# Patient Record
Sex: FEMALE | Race: WHITE | Employment: UNEMPLOYED | URBAN - METROPOLITAN AREA
[De-identification: names, ages, dates, MRNs, and addresses within clinical notes are randomized per-mention and may not be internally consistent; named-entity substitution may affect disease eponyms.]

---

## 2017-01-04 ENCOUNTER — HOSPITAL ENCOUNTER (OUTPATIENT)
Dept: OTHER | Age: 82
Discharge: HOME | End: 2017-01-31
Attending: INTERNAL MEDICINE | Admitting: INTERNAL MEDICINE

## 2017-08-09 ENCOUNTER — HOSPITAL ENCOUNTER (OUTPATIENT)
Dept: WOMENS IMAGING | Age: 82
Discharge: HOME | End: 2017-08-09
Attending: FAMILY MEDICINE | Admitting: FAMILY MEDICINE

## 2017-08-09 DIAGNOSIS — Z12.31 VISIT FOR SCREENING MAMMOGRAM: ICD-10-CM

## 2021-04-07 ENCOUNTER — HOSPITAL ENCOUNTER (OUTPATIENT)
Dept: WOUND CARE | Age: 86
Discharge: HOME | End: 2021-04-07
Payer: MEDICARE

## 2021-04-07 VITALS
WEIGHT: 110.45 LBS | HEIGHT: 63 IN | BODY MASS INDEX: 19.57 KG/M2 | SYSTOLIC BLOOD PRESSURE: 149 MMHG | TEMPERATURE: 97.7 F | HEART RATE: 89 BPM | RESPIRATION RATE: 18 BRPM | DIASTOLIC BLOOD PRESSURE: 84 MMHG

## 2021-04-07 DIAGNOSIS — I73.9 PERIPHERAL VASCULAR DISEASE (HCC): ICD-10-CM
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
## 2021-04-07 DIAGNOSIS — M79.89 PAIN AND SWELLING OF LEFT LOWER LEG: ICD-10-CM

## 2021-04-07 DIAGNOSIS — M79.662 PAIN AND SWELLING OF LEFT LOWER LEG: ICD-10-CM

## 2021-04-07 DIAGNOSIS — S81.802A TRAUMATIC OPEN WOUND OF LOWER LEG, LEFT, INITIAL ENCOUNTER: ICD-10-CM

## 2021-04-07 PROCEDURE — 11045 DBRDMT SUBQ TISS EACH ADDL: CPT

## 2021-04-07 PROCEDURE — 11042 DBRDMT SUBQ TIS 1ST 20SQCM/<: CPT | Performed by: NURSE PRACTITIONER

## 2021-04-07 PROCEDURE — 11045 DBRDMT SUBQ TISS EACH ADDL: CPT | Performed by: NURSE PRACTITIONER

## 2021-04-07 PROCEDURE — 99202 OFFICE O/P NEW SF 15 MIN: CPT | Performed by: NURSE PRACTITIONER

## 2021-04-07 PROCEDURE — 99203 OFFICE O/P NEW LOW 30 MIN: CPT

## 2021-04-07 PROCEDURE — 11042 DBRDMT SUBQ TIS 1ST 20SQCM/<: CPT

## 2021-04-07 RX ORDER — ICOSAPENT ETHYL 1000 MG/1
2 CAPSULE ORAL 2 TIMES DAILY
COMMUNITY
Start: 2021-02-08

## 2021-04-07 RX ORDER — LIDOCAINE HYDROCHLORIDE 40 MG/ML
SOLUTION TOPICAL ONCE
Status: CANCELLED | OUTPATIENT
Start: 2021-04-07 | End: 2021-04-07

## 2021-04-07 RX ORDER — LIDOCAINE 40 MG/G
CREAM TOPICAL ONCE
Status: CANCELLED | OUTPATIENT
Start: 2021-04-07 | End: 2021-04-07

## 2021-04-07 RX ORDER — POTASSIUM CHLORIDE 750 MG/1
10 TABLET, EXTENDED RELEASE ORAL 2 TIMES DAILY
COMMUNITY

## 2021-04-07 RX ORDER — LIDOCAINE HYDROCHLORIDE 20 MG/ML
JELLY TOPICAL ONCE
Status: CANCELLED | OUTPATIENT
Start: 2021-04-07 | End: 2021-04-07

## 2021-04-07 RX ORDER — BACITRACIN, NEOMYCIN, POLYMYXIN B 400; 3.5; 5 [USP'U]/G; MG/G; [USP'U]/G
OINTMENT TOPICAL ONCE
Status: CANCELLED | OUTPATIENT
Start: 2021-04-07 | End: 2021-04-07

## 2021-04-07 RX ORDER — LIDOCAINE 50 MG/G
OINTMENT TOPICAL ONCE
Status: CANCELLED | OUTPATIENT
Start: 2021-04-07 | End: 2021-04-07

## 2021-04-07 RX ORDER — METOPROLOL TARTRATE 100 MG/1
50 TABLET ORAL 2 TIMES DAILY
COMMUNITY

## 2021-04-07 RX ORDER — SPIRONOLACTONE 25 MG/1
25 TABLET ORAL DAILY
COMMUNITY

## 2021-04-07 RX ORDER — CLOBETASOL PROPIONATE 0.5 MG/G
OINTMENT TOPICAL ONCE
Status: CANCELLED | OUTPATIENT
Start: 2021-04-07 | End: 2021-04-07

## 2021-04-07 RX ORDER — GINSENG 100 MG
CAPSULE ORAL ONCE
Status: CANCELLED | OUTPATIENT
Start: 2021-04-07 | End: 2021-04-07

## 2021-04-07 RX ORDER — BETAMETHASONE DIPROPIONATE 0.05 %
OINTMENT (GRAM) TOPICAL ONCE
Status: CANCELLED | OUTPATIENT
Start: 2021-04-07 | End: 2021-04-07

## 2021-04-07 RX ORDER — FUROSEMIDE 40 MG/1
80 TABLET ORAL 2 TIMES DAILY
COMMUNITY
Start: 2021-02-08

## 2021-04-07 RX ORDER — RANOLAZINE 500 MG/1
500 TABLET, EXTENDED RELEASE ORAL 2 TIMES DAILY
COMMUNITY

## 2021-04-07 RX ORDER — LIDOCAINE HYDROCHLORIDE 40 MG/ML
SOLUTION TOPICAL ONCE
Status: COMPLETED | OUTPATIENT
Start: 2021-04-07 | End: 2021-04-07

## 2021-04-07 RX ORDER — ISOSORBIDE MONONITRATE 60 MG/1
120 TABLET, EXTENDED RELEASE ORAL DAILY
COMMUNITY

## 2021-04-07 RX ORDER — GENTAMICIN SULFATE 1 MG/G
OINTMENT TOPICAL ONCE
Status: CANCELLED | OUTPATIENT
Start: 2021-04-07 | End: 2021-04-07

## 2021-04-07 RX ORDER — BACITRACIN ZINC AND POLYMYXIN B SULFATE 500; 1000 [USP'U]/G; [USP'U]/G
OINTMENT TOPICAL ONCE
Status: CANCELLED | OUTPATIENT
Start: 2021-04-07 | End: 2021-04-07

## 2021-04-07 RX ORDER — ATORVASTATIN CALCIUM 80 MG/1
80 TABLET, FILM COATED ORAL
COMMUNITY
Start: 2020-12-24

## 2021-04-07 RX ORDER — PANTOPRAZOLE SODIUM 40 MG/1
40 GRANULE, DELAYED RELEASE ORAL
COMMUNITY

## 2021-04-07 RX ADMIN — LIDOCAINE HYDROCHLORIDE 5 ML: 40 SOLUTION TOPICAL at 11:23

## 2021-04-07 ASSESSMENT — PAIN - FUNCTIONAL ASSESSMENT: PAIN_FUNCTIONAL_ASSESSMENT: ACTIVITIES ARE NOT PREVENTED

## 2021-04-07 ASSESSMENT — PAIN DESCRIPTION - FREQUENCY: FREQUENCY: INTERMITTENT

## 2021-04-07 ASSESSMENT — PAIN DESCRIPTION - PAIN TYPE: TYPE: ACUTE PAIN

## 2021-04-07 ASSESSMENT — PAIN DESCRIPTION - DESCRIPTORS: DESCRIPTORS: SHARP

## 2021-04-07 NOTE — PROGRESS NOTES
tongue at first sign of chest pain. May repeat every 5 minutes until relief is obtained. If pain persists after taking 3 tabs in a 15-minute period, or the pain is different than is typically experienced, call 9-1-1 immediately.  acetaminophen (TYLENOL) 325 MG tablet Take 650 mg by mouth every 4 hours as needed for Pain      Ascorbic Acid (VITAMIN C) 250 MG tablet Take 250 mg by mouth daily      albuterol (PROVENTIL HFA) 108 (90 BASE) MCG/ACT inhaler Inhale 2 puffs into the lungs every 6 hours as needed for Wheezing or Shortness of Breath. 1 Inhaler 2    tiotropium (SPIRIVA) 18 MCG inhalation capsule Inhale 18 mcg into the lungs daily.  methimazole (TAPAZOLE) 5 MG tablet Take 5 mg by mouth daily       Multiple Vitamins-Minerals (THERAPEUTIC MULTIVITAMIN-MINERALS) tablet Take 1 tablet by mouth daily.  CALCIUM CARB-CHOLECALCIFEROL PO Take by mouth        No current facility-administered medications on file prior to encounter. REVIEW OF SYSTEMS  Review of Systems    Pertinent items are noted in HPI.     Objective:      BP (!) 149/84   Pulse 89   Temp 97.7 °F (36.5 °C) (Temporal)   Resp 18   Ht 5' 3\" (1.6 m)   Wt 110 lb 7.2 oz (50.1 kg)   BMI 19.57 kg/m²     Wt Readings from Last 3 Encounters:   04/07/21 110 lb 7.2 oz (50.1 kg)   06/14/18 124 lb 12.5 oz (56.6 kg)   02/11/16 135 lb (61.2 kg)       PHYSICAL EXAM  Physical Exam    General Appearance: alert and oriented to person, place and time, well-developed and well nourished, frail-appearing, pale and appears younger than stated age  Skin: warm and dry  Head: normocephalic and atraumatic  Eyes: pupils equal, round, and reactive to light  Pulmonary/Chest: clear to auscultation bilaterally- no wheezes, rales or rhonchi, normal air movement, no respiratory distress  Cardiovascular: normal rate, regular rhythm and intact distal pulses  Extremities: no cyanosis, no clubbing and non-pitting edema-  left lower leg and dull green ecchymosis periwound area. Wound:  Wound base red with moderate amount of slough easily debrided to granular base. Still noting some purplish periwound edges. No overt signs of infection. Noting raised, purple area distal to wound with intact skin. Soft skin surface with only small amount of discomfort when palpated. Assessment:        Problem List Items Addressed This Visit     Falls - Primary    Relevant Orders    Initiate Outpatient Wound Care Protocol    Pain and swelling of left lower leg    Relevant Orders    Initiate Outpatient Wound Care Protocol    Peripheral vascular disease ([address]    Relevant Orders    Initiate Outpatient Wound Care Protocol    Traumatic open wound of lower leg, left, initial encounter    Relevant Orders    Initiate Outpatient Wound Care Protocol           Procedure Note  Indications:  Based on my examination of this patient's wound(s)/ulcer(s) today, debridement is required to promote healing and evaluate the wound base. Performed by: YASMIN Chavez CNP    Consent obtained:  Yes    Time out taken:  Yes    Pain Control: Anesthetic  Anesthetic: 4% Lidocaine Liquid Topical(5 ml)       Debridement: Excisional Debridement    Using curette and forceps the wound(s)/ulcer(s) was/were debrided down through and including the removal of epidermis, dermis and subcutaneous tissue. Devitalized Tissue Debrided:  fibrin, biofilm and slough    Pre Debridement Measurements:  Are located in the [place]  Documentation Flow Sheet    Diabetic/Pressure/Non Pressure Ulcers only:  Ulcer: Non-Pressure ulcer, fat layer exposed     Wound/Ulcer #: 1    Post Debridement Measurements:  Wound/Ulcer Descriptions are Pre Debridement except measurements:    Wound 04/07/21 Leg Left; Anterior; Lower #1 Noted 4/2/21 (Active)   Wound Image   04/07/21 1119   Wound Etiology Traumatic 04/07/21 1119   Dressing Status Old drainage noted 04/07/21 1119   Wound Cleansed Vashe 04/07/21 1131   Dressing/Treatment Honey gel/honey paste;Gauze dressing/dressing sponge;Roll gauze 04/07/21 1157   Wound Length (cm) 7.5 cm 04/07/21 1119   Wound Width (cm) 2.8 cm 04/07/21 1119   Wound Depth (cm) 0.1 cm 04/07/21 1119   Wound Surface Area (cm^2) 21 cm^2 04/07/21 1119   Wound Volume (cm^3) 2.1 cm^3 04/07/21 1119   Post-Procedure Length (cm) 7.6 cm 04/07/21 1131   Post-Procedure Width (cm) 2.9 cm 04/07/21 1131   Post-Procedure Depth (cm) 0.2 cm 04/07/21 1131   Post-Procedure Surface Area (cm^2) 22.04 cm^2 04/07/21 1131   Post-Procedure Volume (cm^3) 4.41 cm^3 04/07/21 1131   Wound Assessment Granulation tissue;Slough 04/07/21 1119   Drainage Amount Moderate 04/07/21 1119   Drainage Description Serosanguinous 04/07/21 1119   Odor None 04/07/21 1119   Nury-wound Assessment Blanchable erythema 04/07/21 1119   Margins Attached edges 04/07/21 1119   Wound Thickness Description not for Pressure Injury Full thickness 04/07/21 1119   Number of days: 0          Total Surface Area Debrided:  22.04 sq cm     Estimated Blood Loss:  Minimal    Hemostasis Achieved:  by pressure    Procedural Pain:  2  / 10     Post Procedural Pain:  1 / 10     Response to treatment:  Well tolerated by patient. Plan:   Pt/son education per provider related to plan of care and products to be used. Discussed goals of care with moist wound healing and decreasing edema in left leg. Pt is in agreement with plan of care and questions answered. Treatment Note please see attached Discharge Instructions    Written patient dismissal instructions given to patient and signed by patient or POA.          Discharge Instructions         [address] and Hyperbaric Oxygen Therapy   Physician Orders and Discharge Instructions  [address]   [address], [facility]  Telephone: [phone]    NAME:  Lisbeth Freeze OF BIRTH:  1935  MEDICAL RECORD NUMBER:  [id number]  DATE: 4/7/2021       Please wash hands with soap and water prior to and right after  every dressing change    CLEANING YOUR WOUND:     Wound Cleansing:   · Do not scrub or use excessive force. · With each dressing change, rinse wounds with 0.9% Saline. (May use wound wash or soft contact solution. Both can be purchased at a local drug store). · If unable to obtain saline, may use a mild soap and water. · Keep wounds dry in the shower unless otherwise instructed by the physician. · For wounds on lower legs, cast covers can be purchased at local drug stores, so that you may shower and keep the wound(s) dry. · Other:      Topical Treatments:  Do not apply lotions, creams, or ointments to the skin around the wound bed unless directed as followed:      [x] Apply around the wound:   [x] moisturizing lotion  [] Antifungal ointment            [] No-Sting barrier film   [] Zinc paste  [] Other:        Vashe wound cleanser:  [x] Instructions for Vashe Wash solution ONLY: Apply enough Vashe to soak a piece of gauze and place on wound bed for 5-10 minutes. DO NOT rinse after Vashe has been applied. Follow dressing application as instructed below. vashe in clinic  Only   ____________________________________________________________   WHAT TO APPLY TO YOUR WOUND AND HOW OFTEN TO CHANGE IT.     WOUND LOCATION              left lower leg       PRIMARY DRESSING:  Other: medihoney     COVER AND SECURE WITH:  4X4 gauze pad  Conforming roll gauze    Avoid contact of tape with skin. FREQUENCY OF DRESSING CHANGES:  Daily         ______________________________________________________________  ________________________________________________________________    COMPRESSION IS A SIGNIFICANT FACTOR TO HEALING YOUR WOUNDS.          COMPRESSION:    SpandaGrip applied in clinic to left lower leg  SpandaGrip medium compression 10-20 mm/Hg to left lower leg ___________________________________________________________________    DIETARY:  Diet as tolerated, Increase Protein    ACTIVITY:  Activity as tolerated    PAIN:  Elevate the affected limb. Use over-the-counter medications you would normally use for pain as permitted by your family doctor. For persistent pain not relieved by the above interventions, please call your family doctor. Call your doctor now or seek immediate medical care if:    · You have symptoms of infection, such as:  ? Increased pain, swelling, warmth, or redness. ? Red streaks leading from the area. ? Pus draining from the area. ? A fever. FOLLOW UP CARE:           Return Appointment:  · DME/Wound Dressing Supplies Provided by: [facility]  4/7/2021  ? (Please call them directly to reorder supplies when you run out)     · ECF or Home Healthcare:      · Return Appointment: With Radha Duncan CNP  in  [address] [place] [address])  [] Wound Assessment with a nurse on:                 [x] Orders placed during your visit:          Electronically signed by Nathalia Cotto RN on 4/7/2021 at 11:29 AM          [address]: Should you experience any significant changes in your wound(s) or have questions about your wound care, please contact the [address] [place] [address] at [address] 8:30 am - 4:30 pm and Friday 8:30 am - 1:00 pm.  If you need help with your wound outside these hours and cannot wait until we are again available, contact your PCP or go to the hospital emergency room. PLEASE NOTE: IF YOU ARE UNABLE TO OBTAIN WOUND SUPPLIES, CONTINUE TO USE THE SUPPLIES YOU HAVE AVAILABLE UNTIL YOU ARE ABLE TO REACH US. IT IS MOST IMPORTANT TO KEEP THE WOUND COVERED AT ALL TIMES.      Physician Signature:_______________________    Date: ___________ Time:  ____________      Radha Duncan CNP               Electronically signed by YASMIN Mcdonough CNP on 4/7/2021 at 2:40 PM

## 2021-04-09 ENCOUNTER — TELEPHONE (OUTPATIENT)
Dept: WOUND CARE | Age: 86
End: 2021-04-09

## 2021-04-12 ENCOUNTER — HOSPITAL ENCOUNTER (OUTPATIENT)
Dept: WOUND CARE | Age: 86
Discharge: HOME | End: 2021-04-12
Payer: MEDICARE

## 2021-04-12 VITALS — DIASTOLIC BLOOD PRESSURE: 72 MMHG | TEMPERATURE: 97.2 F | RESPIRATION RATE: 18 BRPM | SYSTOLIC BLOOD PRESSURE: 138 MMHG

## 2021-04-12 DIAGNOSIS — I73.9 PERIPHERAL VASCULAR DISEASE (HCC): ICD-10-CM

## 2021-04-12 DIAGNOSIS — M79.89 PAIN AND SWELLING OF LEFT LOWER LEG: ICD-10-CM
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
## 2021-04-12 DIAGNOSIS — M79.662 PAIN AND SWELLING OF LEFT LOWER LEG: ICD-10-CM

## 2021-04-12 DIAGNOSIS — S81.802A TRAUMATIC OPEN WOUND OF LOWER LEG, LEFT, INITIAL ENCOUNTER: ICD-10-CM
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
## 2021-04-12 PROCEDURE — 11042 DBRDMT SUBQ TIS 1ST 20SQCM/<: CPT | Performed by: NURSE PRACTITIONER

## 2021-04-12 PROCEDURE — 11045 DBRDMT SUBQ TISS EACH ADDL: CPT | Performed by: NURSE PRACTITIONER

## 2021-04-12 PROCEDURE — 11042 DBRDMT SUBQ TIS 1ST 20SQCM/<: CPT

## 2021-04-12 PROCEDURE — 11045 DBRDMT SUBQ TISS EACH ADDL: CPT

## 2021-04-12 RX ORDER — LIDOCAINE 40 MG/G
CREAM TOPICAL ONCE
Status: CANCELLED | OUTPATIENT
Start: 2021-04-12 | End: 2021-04-12

## 2021-04-12 RX ORDER — LIDOCAINE 50 MG/G
OINTMENT TOPICAL ONCE
Status: CANCELLED | OUTPATIENT
Start: 2021-04-12 | End: 2021-04-12

## 2021-04-12 RX ORDER — BACITRACIN ZINC AND POLYMYXIN B SULFATE 500; 1000 [USP'U]/G; [USP'U]/G
OINTMENT TOPICAL ONCE
Status: CANCELLED | OUTPATIENT
Start: 2021-04-12 | End: 2021-04-12

## 2021-04-12 RX ORDER — LIDOCAINE HYDROCHLORIDE 20 MG/ML
JELLY TOPICAL ONCE
Status: CANCELLED | OUTPATIENT
Start: 2021-04-12 | End: 2021-04-12

## 2021-04-12 RX ORDER — BETAMETHASONE DIPROPIONATE 0.05 %
OINTMENT (GRAM) TOPICAL ONCE
Status: CANCELLED | OUTPATIENT
Start: 2021-04-12 | End: 2021-04-12

## 2021-04-12 RX ORDER — LIDOCAINE HYDROCHLORIDE 40 MG/ML
SOLUTION TOPICAL ONCE
Status: COMPLETED | OUTPATIENT
Start: 2021-04-12 | End: 2021-04-12

## 2021-04-12 RX ORDER — GENTAMICIN SULFATE 1 MG/G
OINTMENT TOPICAL ONCE
Status: CANCELLED | OUTPATIENT
Start: 2021-04-12 | End: 2021-04-12

## 2021-04-12 RX ORDER — BACITRACIN, NEOMYCIN, POLYMYXIN B 400; 3.5; 5 [USP'U]/G; MG/G; [USP'U]/G
OINTMENT TOPICAL ONCE
Status: CANCELLED | OUTPATIENT
Start: 2021-04-12 | End: 2021-04-12

## 2021-04-12 RX ORDER — GINSENG 100 MG
CAPSULE ORAL ONCE
Status: CANCELLED | OUTPATIENT
Start: 2021-04-12 | End: 2021-04-12

## 2021-04-12 RX ORDER — LIDOCAINE HYDROCHLORIDE 40 MG/ML
SOLUTION TOPICAL ONCE
Status: CANCELLED | OUTPATIENT
Start: 2021-04-12 | End: 2021-04-12

## 2021-04-12 RX ORDER — CLOBETASOL PROPIONATE 0.5 MG/G
OINTMENT TOPICAL ONCE
Status: CANCELLED | OUTPATIENT
Start: 2021-04-12 | End: 2021-04-12

## 2021-04-12 RX ADMIN — LIDOCAINE HYDROCHLORIDE 5 ML: 40 SOLUTION TOPICAL at 15:17

## 2021-04-12 ASSESSMENT — PAIN DESCRIPTION - FREQUENCY: FREQUENCY: INTERMITTENT

## 2021-04-12 ASSESSMENT — PAIN DESCRIPTION - DESCRIPTORS: DESCRIPTORS: BURNING;STABBING

## 2021-04-12 ASSESSMENT — PAIN DESCRIPTION - PAIN TYPE: TYPE: ACUTE PAIN

## 2021-04-12 NOTE — PLAN OF CARE
Wound Care Supplies     PLEASE SEND ORDER FOR HYDROFERA BLUE READY WITH BORDER- SIZE 6\"X6\"       [facility]:      [employer] [id number] [address] p: [phone] f: [phone]      Ordering Center:       [address]  [address] 2 [facility] [address]  [place] [address]  [phone]  WOUND CARE Dept: [phone]   FAX NUMBER [unfilled]     Patient Information:      Fox Webb  [address] [address]   [id number]   : 1935  AGE: 82 y.o. GENDER: female   TODAYS DATE:  2021     Insurance:      PRIMARY INSURANCE:  Plan: MEDICARE PART A AND B  Coverage: MEDICARE  Effective Date: 2015  [id number] - (Commercial)  [id number]     SECONDARY INSURANCE:  Plan: [facility] CARE MEDICARE SUPP  Coverage: [employer]  Effective Date: 2015  Keeleynorma Rollins  [id number]  [unfilled]   [unfilled]      Patient Wound Information:           Problem List Items Addressed This Visit           Falls - Primary      Relevant Orders      Initiate Outpatient Wound Care Protocol      Pain and swelling of left lower leg      Relevant Orders      Initiate Outpatient Wound Care Protocol      Peripheral vascular disease ([address]      Relevant Orders      Initiate Outpatient Wound Care Protocol      Traumatic open wound of lower leg, left, initial encounter      Relevant Orders      Initiate Outpatient Wound Care Protocol           ICD-10 codes: P92.917Z     WOUNDS REQUIRING DRESSING SUPPLIES:           Wound 21 Leg Left; Anterior; Lower #1 Noted 21 (Active)   Wound Image   21 1119   Wound Etiology Traumatic 21 1119   Dressing Status Old drainage noted 21 1119   Wound Length (cm) 7.5 cm 21 1119   Wound Width (cm) 2.8 cm 21 1119   Wound Depth (cm) 0.1 cm 21 1119   Wound Surface Area (cm^2) 21 cm^2 21 1119   Wound Volume (cm^3) 2.1 cm^3 21 1119

## 2021-04-13 ENCOUNTER — TELEPHONE (OUTPATIENT)
Dept: WOUND CARE | Age: 86
End: 2021-04-13

## 2021-04-13 NOTE — PROGRESS NOTES
[address]   Progress Note and Procedure Note      Kirk Morocho OSVALDO [facility]  MEDICAL RECORD NUMBER:  [id number]  AGE: 82 y.o. GENDER: female  : 1935  EPISODE DATE:  2021    Subjective:     Chief Complaint   Patient presents with    Wound Check     fOLLOW UP WOUND LEFT ANTERIOR LEG         HISTORY of PRESENT ILLNESS MAX Jauregui is a 82 y.o. female who presents today for wound/ulcer evaluation. Pt accompanied by her daughter. Pt reports has fallen twice; once on 21 when went to see Dr. Lisa Das, then fell again outside his office. Wearing portable oxygen via nasal cannula 3 liters.    History of Wound Context: falls  Wound/Ulcer Pain Timing/Severity: intermittent, moderate  Quality of pain: sharp, shooting less frequent this week  Severity:  2 / 10   Modifying Factors: Pain is relieved/improved with rest  Associated Signs/Symptoms: edema and drainage     Ulcer Identification:  Ulcer Type: traumatic     Contributing Factors: edema, venous stasis, decreased mobility, shear force and anticoagulation therapy     Acute Wound: N/A  PAST MEDICAL HISTORY        Diagnosis Date    Atrial fibrillation (HCC)     CAD (coronary artery disease)     CHF (congestive heart failure) (HCC)     Constipation 9/22/15    ED visit for constipation    [facility]     Falls 2021    Hyperlipidemia     Hypertension     Pain and swelling of left lower leg 2021    Peripheral vascular disease ([facility] 2021    Thyroid disease     Traumatic open wound of lower leg, left, initial encounter 2021       PAST SURGICAL HISTORY    Past Surgical History:   Procedure Laterality Date    COLONOSCOPY      EYE SURGERY      bilateral cataract extraction    VASCULAR SURGERY      AAA repair  right carotid endarterectomy       FAMILY HISTORY    Family History   Problem Relation Age of Onset    Other Mother     Cancer Father     Heart Disease Father     Heart Disease Sister     Heart Disease Brother        SOCIAL HISTORY    Social History     Tobacco Use    Smoking status: Former Smoker    Smokeless tobacco: Never Used   Substance Use Topics    Alcohol use: Yes     Alcohol/week: 7.0 standard drinks     Types: 7 Glasses of wine per week    Drug use: No       ALLERGIES    Allergies   Allergen Reactions    Seasonal        MEDICATIONS    Current Outpatient Medications on File Prior to Encounter   Medication Sig Dispense Refill    metoprolol (LOPRESSOR) 100 MG tablet Take 50 mg by mouth 2 times daily      atorvastatin (LIPITOR) 80 MG tablet Take 80 mg by mouth Daily with supper      apixaban (ELIQUIS) 2.5 MG TABS tablet Take 2.5 mg by mouth 2 times daily      furosemide (LASIX) 40 MG tablet Take 80 mg by mouth 2 times daily      Icosapent Ethyl (VASCEPA) 1 g CAPS capsule Take 2 capsules by mouth 2 times daily      Fe Bisgly-Succ-C-Thre-B12-FA (IRON-150 PO) Take 1 tablet by mouth daily      Wound Dressings ([facility] WOUND/BURN DRESSING) GEL gel Apply 1 each topically daily 1 Tube 0    isosorbide mononitrate (IMDUR) 60 MG extended release tablet Take 120 mg by mouth daily      pantoprazole sodium (PROTONIX) 40 MG PACK packet Take 40 mg by mouth every morning (before breakfast)      potassium chloride (KLOR-CON M) 10 MEQ extended release tablet Take 10 mEq by mouth 2 times daily      ranolazine (RANEXA) 500 MG extended release tablet Take 500 mg by mouth 2 times daily      spironolactone (ALDACTONE) 25 MG tablet Take 25 mg by mouth daily      fluticasone-salmeterol (WIXELA INHUB) 500-50 MCG/DOSE diskus inhaler Inhale 1 puff into the lungs 2 times daily      clopidogrel (PLAVIX) 75 MG tablet Take 75 mg by mouth daily      nitroGLYCERIN (NITROSTAT) 0.4 MG SL tablet Place 0.4 mg under the tongue every 5 minutes as needed for Chest pain Dissolve 1 tab under tongue at first sign of chest pain.  May repeat every 5 minutes until relief is infection. Assessment:        Problem List Items Addressed This Visit     Falls - Primary    Pain and swelling of left lower leg    Peripheral vascular disease ([address]    Traumatic open wound of lower leg, left, initial encounter           Procedure Note  Indications:  Based on my examination of this patient's wound(s)/ulcer(s) today, debridement is required to promote healing and evaluate the wound base. Performed by: YASMIN Santoyo CNP    Consent obtained:  Yes    Time out taken:  Yes    Pain Control: Anesthetic  Anesthetic: 4% Lidocaine Liquid Topical(5 ml)       Debridement: Excisional Debridement    Using curette the wound(s)/ulcer(s) was/were debrided down through and including the removal of epidermis, dermis and subcutaneous tissue. Devitalized Tissue Debrided:  fibrin, biofilm and slough    Pre Debridement Measurements:  Are located in the [place]  Documentation Flow Sheet    Diabetic/Pressure/Non Pressure Ulcers only:  Ulcer: Non-Pressure ulcer, fat layer exposed     Wound/Ulcer #: 1    Post Debridement Measurements:  Wound/Ulcer Descriptions are Pre Debridement except measurements:    Wound 04/07/21 Leg Left; Anterior; Lower #1 Noted 4/2/21 (Active)   Wound Image   04/07/21 1119   Wound Etiology Traumatic 04/07/21 1119   Dressing Status Old drainage noted 04/12/21 1455   Wound Cleansed Vashe 04/07/21 1131   Dressing/Treatment Hydrofera blue 04/12/21 1550   Wound Length (cm) 6.4 cm 04/12/21 1455   Wound Width (cm) 4.2 cm 04/12/21 1455   Wound Depth (cm) 0.1 cm 04/12/21 1455   Wound Surface Area (cm^2) 26.88 cm^2 04/12/21 1455   Change in Wound Size % (l*w) -28 04/12/21 1455   Wound Volume (cm^3) 2.69 cm^3 04/12/21 1455   Wound Healing % -28 04/12/21 1455   Post-Procedure Length (cm) 6.5 cm 04/12/21 1533   Post-Procedure Width (cm) 4.3 cm 04/12/21 1533   Post-Procedure Depth (cm) 0.2 cm 04/12/21 1533   Post-Procedure Surface Area (cm^2) 27.95 cm^2 04/12/21 1533   Post-Procedure Volume (cm^3) 5.59 cm^3 04/12/21 1533   Wound Assessment Granulation tissue;Slough 04/12/21 1455   Drainage Amount Small 04/12/21 1455   Drainage Description Serosanguinous 04/12/21 1455   Odor None 04/12/21 1455   Nury-wound Assessment Blanchable erythema 04/12/21 1455   Margins Attached edges 04/12/21 1455   Wound Thickness Description not for Pressure Injury Full thickness 04/12/21 1455   Number of days: 6          Total Surface Area Debrided:  27.95 sq cm     Estimated Blood Loss:  Minimal    Hemostasis Achieved:  by pressure and by silver nitrate stick    Procedural Pain:  3  / 10     Post Procedural Pain:  2 / 10     Response to treatment:  Well tolerated by patient. Plan:   Pt and daughter education per per provider related to improvements in wound and changes of products in plan of care to [facility]. Pt in agreement of plan and questions answered. Treatment Note please see attached Discharge Instructions    Written patient dismissal instructions given to patient and signed by patient or POA. Discharge [facility]  and Hyperbaric Oxygen Therapy   Physician Orders and Discharge Instructions  [address] [address]  Telephone: [phone]     NAME:  Dewey Sanchez OF BIRTH:  1935  MEDICAL RECORD NUMBER:  [id number]  DATE:  4/12/2021        Please wash hands with soap and water prior to and right after  every dressing change    CLEANING YOUR WOUND:      Wound Cleansing:   · Do not scrub or use excessive force. · With each dressing change, rinse wounds with 0.9% Saline. (May use wound wash or soft contact solution. Both can be purchased at a local drug store). · If unable to obtain saline, may use a mild soap and water. · Keep wounds dry in the shower unless otherwise instructed by the physician.   · For wounds on lower legs, cast covers can be purchased at local drug stores, so that you may shower and keep the wound(s) dry. · Other:        Topical Treatments:  Do not apply lotions, creams, or ointments to the skin around the wound bed unless directed as followed:      [x]? ? Apply around the wound:   [x]? ? moisturizing lotion  []? ? Antifungal ointment            []? ? No-Sting barrier film   []? ? Zinc paste  []? ? Other:             ____________________________________________________________   WHAT TO APPLY TO YOUR WOUND AND HOW OFTEN TO CHANGE IT.      WOUND LOCATION              left lower leg                  PRIMARY DRESSING:  HYDROFERA BLUE READY           Avoid contact of tape with skin.     FREQUENCY OF DRESSING CHANGES:  EVERY OTHER DAY                  ______________________________________________________________  ________________________________________________________________     COMPRESSION IS A SIGNIFICANT FACTOR TO HEALING YOUR WOUNDS.          COMPRESSION:     SpandaGrip applied in clinic to left lower leg  SpandaGrip medium compression 10-20 mm/Hg to left lower leg         ___________________________________________________________________     DIETARY:  Diet as tolerated, Increase Protein     ACTIVITY:  Activity as tolerated     PAIN:  Elevate the affected limb. Use over-the-counter medications you would normally use for pain as permitted by your family doctor. For persistent pain not relieved by the above interventions, please call your family doctor.     Call your doctor now or seek immediate medical care if:    · You have symptoms of infection, such as:  ? Increased pain, swelling, warmth, or redness. ? Red streaks leading from the area. ? Pus draining from the area. ? A fever.         FOLLOW UP CARE:            Return Appointment:  · DME/Wound Dressing Supplies Provided by: [facility]  4/12/2021  ?  (Please call them directly to reorder supplies when you run out)     · ECF or Home Healthcare:      · Return Appointment: With Thanh Shearer CNP  in  1  Week(s)  []? ? Wound Assessment with a nurse on:                 [x]? ? Orders placed during your visit:            Electronically signed by Jaime Melissa RN on 4/12/2021 at 3:48 PM                [facility] Information: Should you experience any significant changes in your wound(s) or have questions about your wound care, please contact the [address] [place] [address] at [address] 8:30 am - 4:30 pm and Friday 8:30 am - 1:00 pm.  If you need help with your wound outside these hours and cannot wait until we are again available, contact your PCP or go to the hospital emergency room.      PLEASE NOTE: IF YOU ARE UNABLE TO OBTAIN WOUND SUPPLIES, CONTINUE TO USE THE SUPPLIES YOU HAVE AVAILABLE UNTIL YOU ARE ABLE TO [facility]. IT IS MOST IMPORTANT TO KEEP THE WOUND COVERED AT ALL TIMES.      Physician Signature:_______________________     Date: ___________ Time:  ____________        Hoda Da Silva CNP          Electronically signed by YASMIN Chu CNP on 4/13/2021 at 12:09 PM

## 2021-04-19 ENCOUNTER — HOSPITAL ENCOUNTER (OUTPATIENT)
Dept: WOUND CARE | Age: 86
Discharge: HOME | End: 2021-04-19
Payer: MEDICARE

## 2021-04-19 VITALS
RESPIRATION RATE: 18 BRPM | DIASTOLIC BLOOD PRESSURE: 75 MMHG | HEART RATE: 92 BPM | SYSTOLIC BLOOD PRESSURE: 164 MMHG | TEMPERATURE: 97 F

## 2021-04-19 DIAGNOSIS — L97.909 CHRONIC VENOUS HYPERTENSION WITH ULCER (HCC): ICD-10-CM

## 2021-04-19 DIAGNOSIS — M79.89 PAIN AND SWELLING OF LEFT LOWER LEG: Primary | ICD-10-CM

## 2021-04-19 DIAGNOSIS — M79.662 PAIN AND SWELLING OF LEFT LOWER LEG: Primary | ICD-10-CM

## 2021-04-19 DIAGNOSIS — S81.802A TRAUMATIC OPEN WOUND OF LOWER LEG, LEFT, INITIAL ENCOUNTER: ICD-10-CM

## 2021-04-19 DIAGNOSIS — L08.9 TRAUMATIC OPEN WOUND OF LOWER LEG WITH INFECTION, LEFT, SUBSEQUENT ENCOUNTER: ICD-10-CM

## 2021-04-19 DIAGNOSIS — S81.802D TRAUMATIC OPEN WOUND OF LOWER LEG WITH INFECTION, LEFT, SUBSEQUENT ENCOUNTER: ICD-10-CM

## 2021-04-19 DIAGNOSIS — R60.0 LOCALIZED EDEMA: ICD-10-CM

## 2021-04-19 DIAGNOSIS — I73.9 PERIPHERAL VASCULAR DISEASE (HCC): ICD-10-CM

## 2021-04-19 DIAGNOSIS — I87.319 CHRONIC VENOUS HYPERTENSION WITH ULCER (HCC): ICD-10-CM
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
## 2021-04-19 PROCEDURE — 11042 DBRDMT SUBQ TIS 1ST 20SQCM/<: CPT

## 2021-04-19 PROCEDURE — 11042 DBRDMT SUBQ TIS 1ST 20SQCM/<: CPT | Performed by: NURSE PRACTITIONER

## 2021-04-19 RX ORDER — LIDOCAINE 50 MG/G
OINTMENT TOPICAL ONCE
Status: CANCELLED | OUTPATIENT
Start: 2021-04-19 | End: 2021-04-19

## 2021-04-19 RX ORDER — GENTAMICIN SULFATE 1 MG/G
OINTMENT TOPICAL ONCE
Status: CANCELLED | OUTPATIENT
Start: 2021-04-19 | End: 2021-04-19

## 2021-04-19 RX ORDER — LIDOCAINE 40 MG/G
CREAM TOPICAL ONCE
Status: CANCELLED | OUTPATIENT
Start: 2021-04-19 | End: 2021-04-19

## 2021-04-19 RX ORDER — BETAMETHASONE DIPROPIONATE 0.05 %
OINTMENT (GRAM) TOPICAL ONCE
Status: CANCELLED | OUTPATIENT
Start: 2021-04-19 | End: 2021-04-19

## 2021-04-19 RX ORDER — LIDOCAINE HYDROCHLORIDE 40 MG/ML
SOLUTION TOPICAL ONCE
Status: CANCELLED | OUTPATIENT
Start: 2021-04-19 | End: 2021-04-19

## 2021-04-19 RX ORDER — BACITRACIN, NEOMYCIN, POLYMYXIN B 400; 3.5; 5 [USP'U]/G; MG/G; [USP'U]/G
OINTMENT TOPICAL ONCE
Status: CANCELLED | OUTPATIENT
Start: 2021-04-19 | End: 2021-04-19

## 2021-04-19 RX ORDER — CLOBETASOL PROPIONATE 0.5 MG/G
OINTMENT TOPICAL ONCE
Status: CANCELLED | OUTPATIENT
Start: 2021-04-19 | End: 2021-04-19

## 2021-04-19 RX ORDER — LIDOCAINE HYDROCHLORIDE 40 MG/ML
SOLUTION TOPICAL ONCE
Status: COMPLETED | OUTPATIENT
Start: 2021-04-19 | End: 2021-04-19

## 2021-04-19 RX ORDER — LIDOCAINE HYDROCHLORIDE 20 MG/ML
JELLY TOPICAL ONCE
Status: CANCELLED | OUTPATIENT
Start: 2021-04-19 | End: 2021-04-19

## 2021-04-19 RX ORDER — GINSENG 100 MG
CAPSULE ORAL ONCE
Status: CANCELLED | OUTPATIENT
Start: 2021-04-19 | End: 2021-04-19

## 2021-04-19 RX ORDER — BACITRACIN ZINC AND POLYMYXIN B SULFATE 500; 1000 [USP'U]/G; [USP'U]/G
OINTMENT TOPICAL ONCE
Status: CANCELLED | OUTPATIENT
Start: 2021-04-19 | End: 2021-04-19

## 2021-04-19 RX ADMIN — LIDOCAINE HYDROCHLORIDE: 40 SOLUTION TOPICAL at 14:54

## 2021-04-19 ASSESSMENT — PAIN - FUNCTIONAL ASSESSMENT: PAIN_FUNCTIONAL_ASSESSMENT: ACTIVITIES ARE NOT PREVENTED

## 2021-04-19 ASSESSMENT — PAIN DESCRIPTION - FREQUENCY
FREQUENCY: INTERMITTENT
FREQUENCY: INTERMITTENT

## 2021-04-19 ASSESSMENT — PAIN DESCRIPTION - DESCRIPTORS: DESCRIPTORS: ACHING;BURNING

## 2021-04-19 ASSESSMENT — PAIN DESCRIPTION - ORIENTATION: ORIENTATION: LEFT

## 2021-04-19 ASSESSMENT — PAIN DESCRIPTION - PROGRESSION: CLINICAL_PROGRESSION: NOT CHANGED

## 2021-04-19 ASSESSMENT — PAIN DESCRIPTION - PAIN TYPE: TYPE: ACUTE PAIN

## 2021-04-19 ASSESSMENT — PAIN DESCRIPTION - ONSET: ONSET: ON-GOING

## 2021-04-20 PROBLEM — S81.802D TRAUMATIC OPEN WOUND OF LOWER LEG WITH INFECTION, LEFT, SUBSEQUENT ENCOUNTER: Status: ACTIVE | Noted: 2021-04-20

## 2021-04-20 PROBLEM — L08.9 TRAUMATIC OPEN WOUND OF LOWER LEG WITH INFECTION, LEFT, SUBSEQUENT ENCOUNTER: Status: ACTIVE | Noted: 2021-04-20

## 2021-04-20 NOTE — PROGRESS NOTES
[address]   Progress Note and Procedure Note      Margaret [facility]  MEDICAL RECORD NUMBER:  [id number]  AGE: 82 y.o. GENDER: female  : 1935  EPISODE DATE:  2021    Subjective:     Chief Complaint   Patient presents with    Wound Check     Follow-up visit for a wound to the left lower leg. HISTORY of PRESENT ILLNESS HPI  Margaret Torre is a 82 y. o. female who presents today for wound/ulcer evaluation. Pt accompanied by her daughter. Luanne Triana reports has fallen twice; once on 21 when went to see Dr. José Manuel Fatima, then fell again outside his office. Wearing portable oxygen via nasal cannula 3 liters. Believes wounds are improving.    History of Wound Context: falls  Wound/Ulcer Pain Timing/Severity: intermittent, moderate  Quality of pain: sharp, shooting less frequent this week and relieved by taking tylenol  Severity:  2 / 10   Modifying Factors: Pain is relieved/improved with rest  Associated Signs/Symptoms: edema and drainage     Ulcer Identification:  Ulcer Type: traumatic     Contributing Factors: edema, venous stasis, decreased mobility, shear force and anticoagulation therapy     Acute Wound: N/A  PAST MEDICAL HISTORY        Diagnosis Date    Atrial fibrillation ([address]     CAD (coronary artery disease)     CHF (congestive heart failure) ([address]     Constipation 9/22/15    ED visit for constipation    [facility]     Falls 2021    Hyperlipidemia     Hypertension     Pain and swelling of left lower leg 2021    Peripheral vascular disease ([address] 2021    Thyroid disease     Traumatic open wound of lower leg with infection, left, subsequent encounter 2021    Traumatic open wound of lower leg, left, initial encounter 2021       PAST SURGICAL HISTORY    Past Surgical History:   Procedure Laterality Date    COLONOSCOPY      EYE SURGERY      bilateral cataract extraction    VASCULAR SURGERY AAA repair  right carotid endarterectomy       FAMILY HISTORY    Family History   Problem Relation Age of Onset    Other Mother     Cancer Father     Heart Disease Father     Heart Disease Sister     Heart Disease Brother        SOCIAL HISTORY    Social History     Tobacco Use    Smoking status: Former Smoker    Smokeless tobacco: Never Used   Substance Use Topics    Alcohol use:  Yes     Alcohol/week: 7.0 standard drinks     Types: 7 Glasses of wine per week    Drug use: No       ALLERGIES    Allergies   Allergen Reactions    Seasonal        MEDICATIONS    Current Outpatient Medications on File Prior to Encounter   Medication Sig Dispense Refill    metoprolol (LOPRESSOR) 100 MG tablet Take 50 mg by mouth 2 times daily      atorvastatin (LIPITOR) 80 MG tablet Take 80 mg by mouth Daily with supper      apixaban (ELIQUIS) 2.5 MG TABS tablet Take 2.5 mg by mouth 2 times daily      furosemide (LASIX) 40 MG tablet Take 80 mg by mouth 2 times daily      Icosapent Ethyl (VASCEPA) 1 g CAPS capsule Take 2 capsules by mouth 2 times daily      Fe Bisgly-Succ-C-Thre-B12-FA (IRON-150 PO) Take 1 tablet by mouth daily      Wound Dressings ([facility] WOUND/BURN DRESSING) GEL gel Apply 1 each topically daily 1 Tube 0    isosorbide mononitrate (IMDUR) 60 MG extended release tablet Take 120 mg by mouth daily      pantoprazole sodium (PROTONIX) 40 MG PACK packet Take 40 mg by mouth every morning (before breakfast)      potassium chloride (KLOR-CON M) 10 MEQ extended release tablet Take 10 mEq by mouth 2 times daily      ranolazine (RANEXA) 500 MG extended release tablet Take 500 mg by mouth 2 times daily      spironolactone (ALDACTONE) 25 MG tablet Take 25 mg by mouth daily      fluticasone-salmeterol (WIXELA INHUB) 500-50 MCG/DOSE diskus inhaler Inhale 1 puff into the lungs 2 times daily      clopidogrel (PLAVIX) 75 MG tablet Take 75 mg by mouth daily      nitroGLYCERIN (NITROSTAT) 0.4 MG SL tablet Place 0.4 mg under the tongue every 5 minutes as needed for Chest pain Dissolve 1 tab under tongue at first sign of chest pain. May repeat every 5 minutes until relief is obtained. If pain persists after taking 3 tabs in a 15-minute period, or the pain is different than is typically experienced, call 9-1-1 immediately.  acetaminophen (TYLENOL) 325 MG tablet Take 650 mg by mouth every 4 hours as needed for Pain      Ascorbic Acid (VITAMIN C) 250 MG tablet Take 250 mg by mouth daily      albuterol (PROVENTIL HFA) 108 (90 BASE) MCG/ACT inhaler Inhale 2 puffs into the lungs every 6 hours as needed for Wheezing or Shortness of Breath. 1 Inhaler 2    tiotropium (SPIRIVA) 18 MCG inhalation capsule Inhale 18 mcg into the lungs daily.  methimazole (TAPAZOLE) 5 MG tablet Take 5 mg by mouth daily       Multiple Vitamins-Minerals (THERAPEUTIC MULTIVITAMIN-MINERALS) tablet Take 1 tablet by mouth daily.  CALCIUM CARB-CHOLECALCIFEROL PO Take by mouth        No current facility-administered medications on file prior to encounter. REVIEW OF SYSTEMS  Review of Systems    Pertinent items are noted in HPI. Objective:      BP (!) 164/75   Pulse 92   Temp 97 °F (36.1 °C) (Temporal)   Resp 18     Wt Readings from Last 3 Encounters:   04/07/21 110 lb 7.2 oz (50.1 kg)   06/14/18 124 lb 12.5 oz (56.6 kg)   02/11/16 135 lb (61.2 kg)       PHYSICAL EXAM  Physical Exam    General Appearance: alert and oriented to person, place and time, well-developed and well-nourished, in no acute distress and frail-appearing  Skin: warm and dry  Head: normocephalic and atraumatic  Eyes: pupils equal, round, and reactive to light  Pulmonary/Chest:  normal air movement, no respiratory distress, oxygen in place via nasal cannula  Cardiovascular: normal rate, regular rhythm and intact distal pulses  Extremities: no cyanosis, no clubbing and 1 + edema-  left lower leg and dorsal foot  Wounds:  Wound base red, moist granular.   Debrided easily to 04/19/21 1504   Post-Procedure Depth (cm) 0.2 cm 04/19/21 1504   Post-Procedure Surface Area (cm^2) 14.64 cm^2 04/19/21 1504   Post-Procedure Volume (cm^3) 2.93 cm^3 04/19/21 1504   Wound Assessment Granulation tissue;Slough 04/19/21 1440   Drainage Amount Moderate 04/19/21 1440   Drainage Description Serosanguinous 04/19/21 1440   Odor None 04/19/21 1440   Nury-wound Assessment Dry/flaky 04/19/21 1440   Margins Attached edges 04/19/21 1440   Wound Thickness Description not for Pressure Injury Full thickness 04/19/21 1440   Number of days: 12          Total Surface Area Debrided:  14.64 sq cm     Estimated Blood Loss:  Minimal    Hemostasis Achieved:  by pressure    Procedural Pain:  2  / 10     Post Procedural Pain:  0 / 10     Response to treatment:  Well tolerated by patient. Plan:   Pt/daughter education per provider related to progress of wound to healing. Will add collagen product under Hydrofera blue. Pt agreeable to plan of care and questions answered. Treatment Note please see attached Discharge Instructions    Written patient dismissal instructions given to patient and signed by patient or POA. Discharge Instructions         [facility] Wound Care and Hyperbaric Oxygen Therapy   Physician Orders and Discharge Instructions  [place] [address] [address] [facility] [address], [facility] [address]  Telephone: [phone]      FAX [phone]     NAME: Malena Wooten  DATE OF BIRTH:  1935  MEDICAL RECORD NUMBER:  [id number]  DATE:  4/19/2021        Please wash hands with soap and water prior to and right after  every dressing change    CLEANING YOUR WOUND:      Wound Cleansing:   · Do not scrub or use excessive force. · With each dressing change, rinse wounds with 0.9% Saline. (May use wound wash or soft contact solution. Both can be purchased at a local drug store). · If unable to obtain saline, may use a mild soap and water.   · Keep wounds dry in the shower unless otherwise instructed by the physician. · For wounds on lower legs, cast covers can be purchased at local drug stores, so that you may shower and keep the wound(s) dry. · Other:        Topical Treatments:  Do not apply lotions, creams, or ointments to the skin around the wound bed unless directed as followed:      [x]? ?? Apply around the wound:   [x]? ?? moisturizing lotion  []??? Antifungal ointment            []? ?? No-Sting barrier film   []? ?? Zinc paste  []??? Other:            place mepilex border to skin tear to left lateral  lower leg.   ____________________________________________________________  Manuel Kory TO APPLY TO YOUR WOUND AND HOW OFTEN TO CHANGE IT.      WOUND LOCATION              left lower leg                  PRIMARY DRESSING:  Plain endoform- moistened with saline  HYDROFERA BLUE READY            Avoid contact of tape with skin.     FREQUENCY OF DRESSING CHANGES:  EVERY OTHER DAY                  ______________________________________________________________  ________________________________________________________________     COMPRESSION IS A SIGNIFICANT FACTOR TO HEALING YOUR WOUNDS.          COMPRESSION:     SpandaGrip applied in clinic to left lower leg  SpandaGrip medium compression 10-20 mm/Hg to left lower leg         ___________________________________________________________________     DIETARY:  Diet as tolerated, Increase Protein     ACTIVITY:  Activity as tolerated     PAIN:  Elevate the affected limb. Use over-the-counter medications you would normally use for pain as permitted by your family doctor. For persistent pain not relieved by the above interventions, please call your family doctor.     Call your doctor now or seek immediate medical care if:    · You have symptoms of infection, such as:  ? Increased pain, swelling, warmth, or redness. ? Red streaks leading from the area. ? Pus draining from the area.   ? A fever.         FOLLOW UP CARE:            Return Appointment:  · DME/Wound Dressing Supplies Provided by: [facility]  4/12/2021  ? (Please call them directly to reorder supplies when you run out)     · ECF or Home Healthcare:      · Return Appointment: With Calderon Galdamez CNP  in  1  Week(s)  []??? Wound Assessment with a nurse on:                 [x]? ?? Orders placed during your visit:            Electronically signed by Carlyle Steen RN on 4/19/2021 at 3:11 PM    [address] [place] [address] Information: Should you experience any significant changes in your wound(s) or have questions about your wound care, please contact the [address] [place] [address] [phone] [address] 8:30 am - 4:30 pm and Friday 8:30 am - 1:00 pm.  If you need help with your wound outside these hours and cannot wait until we are again available, contact your PCP or go to the hospital emergency room.      PLEASE NOTE: IF YOU ARE UNABLE TO OBTAIN WOUND SUPPLIES, CONTINUE TO USE THE SUPPLIES YOU HAVE AVAILABLE UNTIL YOU ARE ABLE TO [facility].  IT IS MOST IMPORTANT TO KEEP THE WOUND COVERED AT ALL TIMES.     Physician Signature:_______________________     Date: ___________ Time:  ____________        Calderon Galdamez CNP             Electronically signed by YASMIN Lamar CNP on 4/20/2021 at 11:16 AM

## 2021-04-26 ENCOUNTER — HOSPITAL ENCOUNTER (OUTPATIENT)
Dept: WOUND CARE | Age: 86
Discharge: HOME | End: 2021-04-26
Payer: MEDICARE

## 2021-04-26 VITALS
DIASTOLIC BLOOD PRESSURE: 73 MMHG | TEMPERATURE: 97.3 F | HEART RATE: 93 BPM | RESPIRATION RATE: 18 BRPM | SYSTOLIC BLOOD PRESSURE: 132 MMHG

## 2021-04-26 DIAGNOSIS — I83.029 VENOUS ULCER OF LEFT LEG (HCC): ICD-10-CM
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
## 2021-04-26 DIAGNOSIS — L08.9 TRAUMATIC OPEN WOUND OF LOWER LEG WITH INFECTION, LEFT, SUBSEQUENT ENCOUNTER: Primary | ICD-10-CM

## 2021-04-26 DIAGNOSIS — L97.929 VENOUS ULCER OF LEFT LEG (HCC): ICD-10-CM

## 2021-04-26 DIAGNOSIS — S81.802A TRAUMATIC OPEN WOUND OF LOWER LEG, LEFT, INITIAL ENCOUNTER: ICD-10-CM

## 2021-04-26 DIAGNOSIS — L97.909 CHRONIC VENOUS HYPERTENSION WITH ULCER (HCC): ICD-10-CM

## 2021-04-26 DIAGNOSIS — I87.319 CHRONIC VENOUS HYPERTENSION WITH ULCER (HCC): ICD-10-CM

## 2021-04-26 DIAGNOSIS — I73.9 PERIPHERAL VASCULAR DISEASE (HCC): ICD-10-CM

## 2021-04-26 DIAGNOSIS — M79.89 PAIN AND SWELLING OF LEFT LOWER LEG: ICD-10-CM

## 2021-04-26 DIAGNOSIS — M79.662 PAIN AND SWELLING OF LEFT LOWER LEG: ICD-10-CM

## 2021-04-26 DIAGNOSIS — S81.802D TRAUMATIC OPEN WOUND OF LOWER LEG WITH INFECTION, LEFT, SUBSEQUENT ENCOUNTER: Primary | ICD-10-CM

## 2021-04-26 PROCEDURE — 11042 DBRDMT SUBQ TIS 1ST 20SQCM/<: CPT

## 2021-04-26 PROCEDURE — 11042 DBRDMT SUBQ TIS 1ST 20SQCM/<: CPT | Performed by: NURSE PRACTITIONER

## 2021-04-26 RX ORDER — CLOBETASOL PROPIONATE 0.5 MG/G
OINTMENT TOPICAL ONCE
Status: CANCELLED | OUTPATIENT
Start: 2021-04-26 | End: 2021-04-26

## 2021-04-26 RX ORDER — BACITRACIN, NEOMYCIN, POLYMYXIN B 400; 3.5; 5 [USP'U]/G; MG/G; [USP'U]/G
OINTMENT TOPICAL ONCE
Status: CANCELLED | OUTPATIENT
Start: 2021-04-26 | End: 2021-04-26

## 2021-04-26 RX ORDER — BETAMETHASONE DIPROPIONATE 0.05 %
OINTMENT (GRAM) TOPICAL ONCE
Status: CANCELLED | OUTPATIENT
Start: 2021-04-26 | End: 2021-04-26

## 2021-04-26 RX ORDER — GENTAMICIN SULFATE 1 MG/G
OINTMENT TOPICAL ONCE
Status: CANCELLED | OUTPATIENT
Start: 2021-04-26 | End: 2021-04-26

## 2021-04-26 RX ORDER — LIDOCAINE HYDROCHLORIDE 40 MG/ML
SOLUTION TOPICAL ONCE
Status: COMPLETED | OUTPATIENT
Start: 2021-04-26 | End: 2021-04-26

## 2021-04-26 RX ORDER — BACITRACIN ZINC AND POLYMYXIN B SULFATE 500; 1000 [USP'U]/G; [USP'U]/G
OINTMENT TOPICAL ONCE
Status: CANCELLED | OUTPATIENT
Start: 2021-04-26 | End: 2021-04-26

## 2021-04-26 RX ORDER — LIDOCAINE HYDROCHLORIDE 20 MG/ML
JELLY TOPICAL ONCE
Status: CANCELLED | OUTPATIENT
Start: 2021-04-26 | End: 2021-04-26

## 2021-04-26 RX ORDER — LIDOCAINE 50 MG/G
OINTMENT TOPICAL ONCE
Status: CANCELLED | OUTPATIENT
Start: 2021-04-26 | End: 2021-04-26

## 2021-04-26 RX ORDER — GINSENG 100 MG
CAPSULE ORAL ONCE
Status: CANCELLED | OUTPATIENT
Start: 2021-04-26 | End: 2021-04-26

## 2021-04-26 RX ORDER — LIDOCAINE HYDROCHLORIDE 40 MG/ML
SOLUTION TOPICAL ONCE
Status: CANCELLED | OUTPATIENT
Start: 2021-04-26 | End: 2021-04-26

## 2021-04-26 RX ORDER — LIDOCAINE 40 MG/G
CREAM TOPICAL ONCE
Status: CANCELLED | OUTPATIENT
Start: 2021-04-26 | End: 2021-04-26

## 2021-04-26 RX ADMIN — LIDOCAINE HYDROCHLORIDE 5 ML: 40 SOLUTION TOPICAL at 14:17

## 2021-04-26 ASSESSMENT — PAIN SCALES - GENERAL: PAINLEVEL_OUTOF10: 0

## 2021-04-27 NOTE — PROGRESS NOTES
[address]   Progress Note and Procedure Note      Hadley Oconnell [facility]  MEDICAL RECORD NUMBER:  [id number]  AGE: 82 y.o. GENDER: female  : 1935  EPISODE DATE:  2021    Subjective:     Chief Complaint   Patient presents with    Wound Check     follow up left lower leg wound         HISTORY of PRESENT ILLNESS HPI  Hadley Torre is a 82 y. o. female who presents today for wound/ulcer evaluation. Pt accompanied by her son.  Pt reports has fallen twice; once on 21 when went to see Dr. Gwendolyn Cooley, then fell again outside his office.  Denies falls today. Wearing portable oxygen via nasal cannula 3 liters. Believes wounds are improving. History of Wound Context: falls  Wound/Ulcer Pain Timing/Severity: intermittent, moderate  Quality of pain: sharp, shooting less frequent this week and relieved by taking tylenol  Severity:  2 / 10   Modifying Factors: Pain is relieved/improved with rest  Associated Signs/Symptoms: edema and drainage     Ulcer Identification:  Ulcer Type: traumatic     Contributing Factors: edema, venous stasis, decreased mobility, shear force and anticoagulation therapy     Acute Wound: N/A    Pt assessment of history of frequent falls and home prevention measures (eg rugs), slow rising from sitting or lying positions. Venous stasis condition and related edema.        PAST MEDICAL HISTORY        Diagnosis Date    Atrial fibrillation ([address]     CAD (coronary artery disease)     CHF (congestive heart failure) ([address]     Constipation 9/22/15    ED visit for constipation    COPD (chronic obstructive pulmonary disease) ([address]     Falls 2021    Hyperlipidemia     Hypertension     Pain and swelling of left lower leg 2021    Peripheral vascular disease ([address] 2021    Thyroid disease     Traumatic open wound of lower leg with infection, left, subsequent encounter 2021    Traumatic open wound of lower leg, left, initial encounter 2021 PAST SURGICAL HISTORY    Past Surgical History:   Procedure Laterality Date    COLONOSCOPY      EYE SURGERY      bilateral cataract extraction    VASCULAR SURGERY      AAA repair  right carotid endarterectomy       FAMILY HISTORY    Family History   Problem Relation Age of Onset    Other Mother     Cancer Father     Heart Disease Father     Heart Disease Sister     Heart Disease Brother        SOCIAL HISTORY    Social History     Tobacco Use    Smoking status: Former Smoker    Smokeless tobacco: Never Used   Substance Use Topics    Alcohol use:  Yes     Alcohol/week: 7.0 standard drinks     Types: 7 Glasses of wine per week    Drug use: No       ALLERGIES    Allergies   Allergen Reactions    Seasonal        MEDICATIONS    Current Outpatient Medications on File Prior to Encounter   Medication Sig Dispense Refill    metoprolol (LOPRESSOR) 100 MG tablet Take 50 mg by mouth 2 times daily      atorvastatin (LIPITOR) 80 MG tablet Take 80 mg by mouth Daily with supper      apixaban (ELIQUIS) 2.5 MG TABS tablet Take 2.5 mg by mouth 2 times daily      furosemide (LASIX) 40 MG tablet Take 80 mg by mouth 2 times daily      Icosapent Ethyl (VASCEPA) 1 g CAPS capsule Take 2 capsules by mouth 2 times daily      Fe Bisgly-Succ-C-Thre-B12-FA (IRON-150 PO) Take 1 tablet by mouth daily      Wound Dressings ([facility] WOUND/BURN DRESSING) GEL gel Apply 1 each topically daily 1 Tube 0    isosorbide mononitrate (IMDUR) 60 MG extended release tablet Take 120 mg by mouth daily      pantoprazole sodium (PROTONIX) 40 MG PACK packet Take 40 mg by mouth every morning (before breakfast)      potassium chloride (KLOR-CON M) 10 MEQ extended release tablet Take 10 mEq by mouth 2 times daily      ranolazine (RANEXA) 500 MG extended release tablet Take 500 mg by mouth 2 times daily      spironolactone (ALDACTONE) 25 MG tablet Take 25 mg by mouth daily      fluticasone-salmeterol (WIXELA INHUB) 500-50 MCG/DOSE diskus regular rhythm and intact distal pulses  Extremities: no cyanosis and no clubbing  Wound:  Filling in nicely with only 2 open areas remaining, less pain in general from wound. No overt signs of infection noted. Assessment:        Problem List Items Addressed This Visit     Chronic venous hypertension with ulcer ([address]    Venous ulcer of left leg (HCC)    Falls    Pain and swelling of left lower leg    Peripheral vascular disease ([address]    Traumatic open wound of lower leg, left, initial encounter    Traumatic open wound of lower leg with infection, left, subsequent encounter - Primary           Procedure Note  Indications:  Based on my examination of this patient's wound(s)/ulcer(s) today, debridement is required to promote healing and evaluate the wound base. Performed by: YASMIN Chu - CNP    Consent obtained:  Yes    Time out taken:  Yes    Pain Control: Anesthetic  Anesthetic: 4% Lidocaine Liquid Topical       Debridement: Excisional Debridement    Using curette and forceps the wound(s)/ulcer(s) was/were debrided down through and including the removal of epidermis, dermis and subcutaneous tissue. Devitalized Tissue Debrided:  fibrin, biofilm and slough    Pre Debridement Measurements:  Are located in the [place]  Documentation Flow Sheet    Diabetic/Pressure/Non Pressure Ulcers only:  Ulcer: Non-Pressure ulcer, fat layer exposed     Wound/Ulcer #: 1    Post Debridement Measurements:  Wound/Ulcer Descriptions are Pre Debridement except measurements:    Wound 04/07/21 Leg Left; Anterior; Lower #1 Noted 4/2/21 (Active)   Wound Image   04/07/21 1119   Wound Etiology Traumatic 04/07/21 1119   Dressing Status Old drainage noted 04/19/21 1440   Wound Cleansed Vashe 04/26/21 1557   Dressing/Treatment Hydrofera blue 04/26/21 1557   Wound Length (cm) 4 cm 04/26/21 1414   Wound Width (cm) 2.2 cm 04/26/21 1414   Wound Depth (cm) 0.1 cm 04/26/21 1414   Wound Surface Area (cm^2) 8.8 cm^2 04/26/21 1414 Change in Wound Size % (l*w) 58.1 04/26/21 1414   Wound Volume (cm^3) 0.88 cm^3 04/26/21 1414   Wound Healing % 58 04/26/21 1414   Post-Procedure Length (cm) 4.1 cm 04/26/21 1429   Post-Procedure Width (cm) 2.3 cm 04/26/21 1429   Post-Procedure Depth (cm) 0.2 cm 04/26/21 1429   Post-Procedure Surface Area (cm^2) 9.43 cm^2 04/26/21 1429   Post-Procedure Volume (cm^3) 1.89 cm^3 04/26/21 1429   Wound Assessment Granulation tissue;Slough 04/26/21 1414   Drainage Amount Small 04/26/21 1414   Drainage Description Serosanguinous 04/26/21 1414   Odor None 04/26/21 1414   Nury-wound Assessment Dry/flaky 04/26/21 1414   Margins Undefined edges 04/26/21 1414   Wound Thickness Description not for Pressure Injury Full thickness 04/26/21 1414   Number of days: 19          Total Surface Area Debrided:  5.65 sq cm     Estimated Blood Loss:  Minimal    Hemostasis Achieved:  not needed    Procedural Pain:  2  / 10     Post Procedural Pain:  1 / 10     Response to treatment:  Well tolerated by patient. Plan:   Pt/son education per provider related to plan of care and continued progress of wound to healing. Discussion of prevention of wounds with edema management, home safety measures to decrease risk of falling. Reviewed importance of edema prevention and lotion to legs to provide more suppleness of tissues. Pt encouraged to continue with same plan of care and is agreeable, questions answered. Treatment Note please see attached Discharge Instructions    Written patient dismissal instructions given to patient and signed by patient or POA.          Discharge Instructions         Discharge Instructions           [facility] Wound Care and Hyperbaric Oxygen Therapy   Physician Orders and Discharge Instructions  [place] [address] [facility] [address], [facility]  Telephone: [phone]      FAX [phone]     NAME: Gia 68:  1935  MEDICAL RECORD NUMBER:  [id number]  DATE:  4/26/2021        Please wash hands with soap and water prior to and right after  every dressing change    CLEANING YOUR WOUND:      Wound Cleansing:   · Do not scrub or use excessive force. · With each dressing change, rinse wounds with 0.9% Saline. (May use wound wash or soft contact solution. Both can be purchased at a local drug store). · If unable to obtain saline, may use a mild soap and water. · Keep wounds dry in the shower unless otherwise instructed by the physician. · For wounds on lower legs, cast covers can be purchased at local drug stores, so that you may shower and keep the wound(s) dry. · Other:        Topical Treatments:  Do not apply lotions, creams, or ointments to the skin around the wound bed unless directed as followed:      [x]? ??? Apply around the wound:   [x]? ??? moisturizing lotion  []???? Antifungal ointment            []???? No-Sting barrier film   []???? Zinc paste  []???? Other:            place mepilex border to skin tear to left lateral  lower leg.   ____________________________________________________________  Deryl So TO APPLY TO YOUR WOUND AND HOW OFTEN TO CHANGE IT.      WOUND LOCATION              left lower leg                  PRIMARY DRESSING:  Plain endoform- moistened with saline  HYDROFERA BLUE READY            Avoid contact of tape with skin.     FREQUENCY OF DRESSING CHANGES:  EVERY OTHER DAY                  ______________________________________________________________  ________________________________________________________________     COMPRESSION IS A SIGNIFICANT FACTOR TO HEALING YOUR WOUNDS.          COMPRESSION:     SpandaGrip applied in clinic to left lower leg  SpandaGrip medium compression 10-20 mm/Hg to left lower leg         ___________________________________________________________________     DIETARY:  Diet as tolerated, Increase Protein     ACTIVITY:  Activity as tolerated     PAIN:  Elevate the affected limb.   Use over-the-counter medications you would normally use for pain as permitted by your family doctor. For persistent pain not relieved by the above interventions, please call your family doctor.     Call your doctor now or seek immediate medical care if:    · You have symptoms of infection, such as:  ? Increased pain, swelling, warmth, or redness. ? Red streaks leading from the area. ? Pus draining from the area. ? A fever.         FOLLOW UP CARE:            Return Appointment:  · DME/Wound Dressing Supplies Provided by: [facility]  4/12/2021  ? (Please call them directly to reorder supplies when you run out)     · ECF or Home Healthcare:      · Return Appointment: With Chao Sharif CNP  in  1  Week(s)  []???? Wound Assessment with a nurse on:                 [x]???? Orders placed during your visit:               Electronically signed by Byron Mendoza RN on 4/26/2021 at 2:29 PM            [address] [place] [address] Information: Should you experience any significant changes in your wound(s) or have questions about your wound care, please contact the [address] [place] [address] [phone] [address] [place], [place] [address]ers 8:30 am - 4:30 pm and Friday 8:30 am - 1:00 pm.  If you need help with your wound outside these hours and cannot wait until we are again available, contact your PCP or go to the hospital emergency room.      PLEASE NOTE: IF YOU ARE UNABLE TO OBTAIN WOUND SUPPLIES, CONTINUE TO USE THE SUPPLIES YOU HAVE AVAILABLE UNTIL YOU ARE ABLE TO [facility].  IT IS MOST IMPORTANT TO KEEP THE WOUND COVERED AT ALL TIMES.     Physician Signature:_______________________     Date: ___________ Time:  ____________        Chao Shraif CNP          Electronically signed by YASMIN Ross CNP on 4/27/2021 at 8:49 AM

## 2021-05-03 ENCOUNTER — HOSPITAL ENCOUNTER (OUTPATIENT)
Dept: WOUND CARE | Age: 86
Discharge: HOME | End: 2021-05-03
Payer: MEDICARE

## 2021-05-03 VITALS
SYSTOLIC BLOOD PRESSURE: 123 MMHG | DIASTOLIC BLOOD PRESSURE: 75 MMHG | HEART RATE: 73 BPM | TEMPERATURE: 97.2 F | RESPIRATION RATE: 18 BRPM

## 2021-05-03 DIAGNOSIS — S81.802A TRAUMATIC OPEN WOUND OF LOWER LEG, LEFT, INITIAL ENCOUNTER: ICD-10-CM

## 2021-05-03 DIAGNOSIS — I73.9 PERIPHERAL VASCULAR DISEASE (HCC): ICD-10-CM

## 2021-05-03 DIAGNOSIS — M79.89 PAIN AND SWELLING OF LEFT LOWER LEG: ICD-10-CM
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
## 2021-05-03 DIAGNOSIS — S81.802D TRAUMATIC OPEN WOUND OF LOWER LEG WITH INFECTION, LEFT, SUBSEQUENT ENCOUNTER: ICD-10-CM

## 2021-05-03 DIAGNOSIS — M79.662 PAIN AND SWELLING OF LEFT LOWER LEG: ICD-10-CM

## 2021-05-03 DIAGNOSIS — L08.9 TRAUMATIC OPEN WOUND OF LOWER LEG WITH INFECTION, LEFT, SUBSEQUENT ENCOUNTER: ICD-10-CM

## 2021-05-03 PROCEDURE — 99212 OFFICE O/P EST SF 10 MIN: CPT | Performed by: NURSE PRACTITIONER

## 2021-05-03 PROCEDURE — 99212 OFFICE O/P EST SF 10 MIN: CPT

## 2021-05-03 ASSESSMENT — PAIN DESCRIPTION - FREQUENCY: FREQUENCY: INTERMITTENT

## 2021-05-03 ASSESSMENT — PAIN DESCRIPTION - ONSET: ONSET: GRADUAL

## 2021-05-03 ASSESSMENT — PAIN DESCRIPTION - DESCRIPTORS: DESCRIPTORS: ACHING

## 2021-05-03 ASSESSMENT — PAIN SCALES - GENERAL
PAINLEVEL_OUTOF10: 0
PAINLEVEL_OUTOF10: 0

## 2021-05-04 NOTE — PROGRESS NOTES
[address]   Progress Note and Procedure Note      Molly Herrera OSVALDO [facility]  MEDICAL RECORD NUMBER:  [id number]  AGE: 82 y.o. GENDER: female  : 1935  EPISODE DATE:  5/3/2021    Subjective:     Chief Complaint   Patient presents with    Wound Check     follow up left lower leg         HISTORY of PRESENT ILLNESS HPI  Molly Torre is a 82 y. o. female who presents today for wound/ulcer evaluation. Pt accompanied by her daughter.  Pt reports has fallen twice; once on 21 when went to see Dr. Nancy Collado, then fell again outside his office.  Denies falls this past week. Wearing portable oxygen via nasal cannula 3 liters. Wounds are now closed.   History of Wound Context: falls  Wound/Ulcer Pain Timing/Severity: intermittent, moderate  Quality of pain: much less pain  Severity:  2 / 10   Modifying Factors: Pain is relieved/improved with rest  Associated Signs/Symptoms: edema and drainage     Ulcer Identification:  Ulcer Type: traumatic     Contributing Factors: edema, venous stasis, decreased mobility, shear force and anticoagulation therapy     Acute Wound: N/A     Pt assessment of history of frequent falls and home prevention measures (eg rugs), slow rising from sitting or lying positions.   Venous stasis condition and related edema.         PAST MEDICAL HISTORY        Diagnosis Date    Atrial fibrillation ([address]     CAD (coronary artery disease)     CHF (congestive heart failure) ([address]     Constipation 9/22/15    ED visit for constipation    COPD (chronic obstructive pulmonary disease) ([address]     Falls 2021    Hyperlipidemia     Hypertension     Pain and swelling of left lower leg 2021    Peripheral vascular disease ([address] 2021    Thyroid disease     Traumatic open wound of lower leg with infection, left, subsequent encounter 2021    Traumatic open wound of lower leg, left, initial encounter 2021       PAST SURGICAL HISTORY    Past Surgical History: Procedure Laterality Date    COLONOSCOPY      EYE SURGERY      bilateral cataract extraction    VASCULAR SURGERY      AAA repair  right carotid endarterectomy       FAMILY HISTORY    Family History   Problem Relation Age of Onset    Other Mother     Cancer Father     Heart Disease Father     Heart Disease Sister     Heart Disease Brother        SOCIAL HISTORY    Social History     Tobacco Use    Smoking status: Former Smoker    Smokeless tobacco: Never Used   Substance Use Topics    Alcohol use:  Yes     Alcohol/week: 7.0 standard drinks     Types: 7 Glasses of wine per week    Drug use: No       ALLERGIES    Allergies   Allergen Reactions    Seasonal        MEDICATIONS    Current Outpatient Medications on File Prior to Encounter   Medication Sig Dispense Refill    metoprolol (LOPRESSOR) 100 MG tablet Take 50 mg by mouth 2 times daily      atorvastatin (LIPITOR) 80 MG tablet Take 80 mg by mouth Daily with supper      apixaban (ELIQUIS) 2.5 MG TABS tablet Take 2.5 mg by mouth 2 times daily      furosemide (LASIX) 40 MG tablet Take 80 mg by mouth 2 times daily      Icosapent Ethyl (VASCEPA) 1 g CAPS capsule Take 2 capsules by mouth 2 times daily      Fe Bisgly-Succ-C-Thre-B12-FA (IRON-150 PO) Take 1 tablet by mouth daily      Wound Dressings ([facility] WOUND/BURN DRESSING) GEL gel Apply 1 each topically daily 1 Tube 0    isosorbide mononitrate (IMDUR) 60 MG extended release tablet Take 120 mg by mouth daily      pantoprazole sodium (PROTONIX) 40 MG PACK packet Take 40 mg by mouth every morning (before breakfast)      potassium chloride (KLOR-CON M) 10 MEQ extended release tablet Take 10 mEq by mouth 2 times daily      ranolazine (RANEXA) 500 MG extended release tablet Take 500 mg by mouth 2 times daily      spironolactone (ALDACTONE) 25 MG tablet Take 25 mg by mouth daily      fluticasone-salmeterol (WIXELA INHUB) 500-50 MCG/DOSE diskus inhaler Inhale 1 puff into the lungs 2 times daily vascular disease ([facility] [facility]    Traumatic open wound of lower leg, left, initial encounter    Traumatic open wound of lower leg with infection, left, subsequent encounter           Procedure Note  Indications:  Based on my examination of this patient's wound(s)/ulcer(s) today, debridement is not required to promote healing and evaluate the wound base. Wound/Ulcer Descriptions are Pre Debridement except measurements:    Wound 04/07/21 Leg Left; Anterior; Lower #1 Noted 4/2/21 (Active)   Wound Image   05/03/21 1359   Wound Etiology Traumatic 04/07/21 1119   Dressing Status Old drainage noted 04/19/21 1440   Wound Cleansed Vashe 04/26/21 1557   Dressing/Treatment Hydrofera blue 04/26/21 1557   Wound Length (cm) 0 cm 05/03/21 1359   Wound Width (cm) 0 cm 05/03/21 1359   Wound Depth (cm) 0 cm 05/03/21 1359   Wound Surface Area (cm^2) 0 cm^2 05/03/21 1359   Change in Wound Size % (l*w) 100 05/03/21 1359   Wound Volume (cm^3) 0 cm^3 05/03/21 1359   Wound Healing % 100 05/03/21 1359   Post-Procedure Length (cm) 0 cm 05/03/21 1415   Post-Procedure Width (cm) 0 cm 05/03/21 1415   Post-Procedure Depth (cm) 0 cm 05/03/21 1415   Post-Procedure Surface Area (cm^2) 0 cm^2 05/03/21 1415   Post-Procedure Volume (cm^3) 0 cm^3 05/03/21 1415   Wound Assessment Epithelialization 05/03/21 1359   Drainage Amount Small 04/26/21 1414   Drainage Description Serosanguinous 04/26/21 1414   Odor None 04/26/21 1414   Nury-wound Assessment Dry/flaky 04/26/21 1414   Margins Undefined edges 04/26/21 1414   Wound Thickness Description not for Pressure Injury Full thickness 04/26/21 1414   Number of days: 27       Plan:   Pt/daughter education per provider related to prevention of reoccur ance of wounds considering fragility of skin and preventive measures in environment as well as topically. Questions answered. Treatment Note please see attached Discharge Instructions    Written patient dismissal instructions given to patient and signed by patient or POA. Discharge Instructions         [address]  Physician Orders   [facility] [place] [address], [address]  Telephone: [phone]    NAME:  Gustavo Griffith OF BIRTH:  1935  MEDICAL RECORD NUMBER:  [id number]  DATE:  5/3/2021    Congratulations! You have completed your treatment. Return to your Primary Care Physician for all your health issues. Resume your ordinary activities as tolerated. Take your medications as prescribed by your primary care physician. Check your skin daily for cracks, bruises, sores, or dryness. Use a moisturizer as needed. Clean and dry your skin, using mild soap and warm water (not hot). Avoid alcohol and caffeine and do not smoke. Maintain a nutritious diet. Avoid pressure on your wound site. Keep your legs elevated above the level of the heart whenever possible. Continue to use wraps/stockings/compression as prescribed. Replace compression stockings every four to six months as needed to ensure proper fit. Wear well-fitting shoes and leg garments. Other: PLACE MEPILEX BORDER, LEAVE ON UNTIL THURSDAY. REMOVE AND SHOWER. MAY REPLACE WITH FOAM LIKE DRESSING . PLACE LOTION TO SURROUNDING AREA.        Physician Signature:______________________    Date: ___________ Time:  ____________    Brooklyn Abebe CNP            Electronically signed by Tripp Shetty RN on 5/3/2021 at 2:16 PM                          Electronically signed by YASMIN Jenkins CNP on 5/4/2021 at 2:42 PM

## 2022-02-16 ENCOUNTER — HOSPITAL ENCOUNTER (OUTPATIENT)
Dept: WOUND CARE | Age: 87
Discharge: HOME | End: 2022-02-16
Payer: MEDICARE

## 2022-02-16 VITALS
DIASTOLIC BLOOD PRESSURE: 83 MMHG | TEMPERATURE: 97.4 F | HEART RATE: 95 BPM | RESPIRATION RATE: 18 BRPM | SYSTOLIC BLOOD PRESSURE: 123 MMHG

## 2022-02-16 DIAGNOSIS — L97.919 VENOUS ULCER OF RIGHT LEG (HCC): Primary | ICD-10-CM

## 2022-02-16 DIAGNOSIS — L97.929 VENOUS ULCER OF LEFT LEG (HCC): ICD-10-CM

## 2022-02-16 DIAGNOSIS — I83.019 VENOUS ULCER OF RIGHT LEG (HCC): Primary | ICD-10-CM

## 2022-02-16 DIAGNOSIS — I83.029 VENOUS ULCER OF LEFT LEG (HCC): ICD-10-CM

## 2022-02-16 PROCEDURE — 99213 OFFICE O/P EST LOW 20 MIN: CPT

## 2022-02-16 PROCEDURE — 11042 DBRDMT SUBQ TIS 1ST 20SQCM/<: CPT

## 2022-02-16 PROCEDURE — 29581 APPL MULTLAYER CMPRN SYS LEG: CPT

## 2022-02-16 RX ORDER — GENTAMICIN SULFATE 1 MG/G
OINTMENT TOPICAL ONCE
Status: CANCELLED | OUTPATIENT
Start: 2022-02-16 | End: 2022-02-16

## 2022-02-16 RX ORDER — GINSENG 100 MG
CAPSULE ORAL ONCE
Status: CANCELLED | OUTPATIENT
Start: 2022-02-16 | End: 2022-02-16

## 2022-02-16 RX ORDER — BETAMETHASONE DIPROPIONATE 0.05 %
OINTMENT (GRAM) TOPICAL ONCE
Status: CANCELLED | OUTPATIENT
Start: 2022-02-16 | End: 2022-02-16

## 2022-02-16 RX ORDER — CLOBETASOL PROPIONATE 0.5 MG/G
OINTMENT TOPICAL ONCE
Status: CANCELLED | OUTPATIENT
Start: 2022-02-16 | End: 2022-02-16

## 2022-02-16 RX ORDER — LIDOCAINE HYDROCHLORIDE 20 MG/ML
JELLY TOPICAL ONCE
Status: CANCELLED | OUTPATIENT
Start: 2022-02-16 | End: 2022-02-16

## 2022-02-16 RX ORDER — BACITRACIN ZINC AND POLYMYXIN B SULFATE 500; 1000 [USP'U]/G; [USP'U]/G
OINTMENT TOPICAL ONCE
Status: CANCELLED | OUTPATIENT
Start: 2022-02-16 | End: 2022-02-16

## 2022-02-16 RX ORDER — LIDOCAINE HYDROCHLORIDE 40 MG/ML
SOLUTION TOPICAL ONCE
Status: COMPLETED | OUTPATIENT
Start: 2022-02-16 | End: 2022-02-16

## 2022-02-16 RX ORDER — BACITRACIN, NEOMYCIN, POLYMYXIN B 400; 3.5; 5 [USP'U]/G; MG/G; [USP'U]/G
OINTMENT TOPICAL ONCE
Status: CANCELLED | OUTPATIENT
Start: 2022-02-16 | End: 2022-02-16

## 2022-02-16 RX ORDER — LIDOCAINE HYDROCHLORIDE 40 MG/ML
SOLUTION TOPICAL ONCE
Status: CANCELLED | OUTPATIENT
Start: 2022-02-16 | End: 2022-02-16

## 2022-02-16 RX ORDER — LIDOCAINE 50 MG/G
OINTMENT TOPICAL ONCE
Status: CANCELLED | OUTPATIENT
Start: 2022-02-16 | End: 2022-02-16

## 2022-02-16 RX ORDER — LIDOCAINE 40 MG/G
CREAM TOPICAL ONCE
Status: CANCELLED | OUTPATIENT
Start: 2022-02-16 | End: 2022-02-16

## 2022-02-16 RX ADMIN — LIDOCAINE HYDROCHLORIDE: 40 SOLUTION TOPICAL at 15:43

## 2022-02-16 ASSESSMENT — PAIN SCALES - GENERAL
PAINLEVEL_OUTOF10: 0
PAINLEVEL_OUTOF10: 0

## 2022-02-16 NOTE — PROGRESS NOTES
[address]   Progress Note and Procedure Note      Sabas Elam OSVALDO [facility]  MEDICAL RECORD NUMBER:  [id number]  AGE: 82 y.o. GENDER: female  : 1935  EPISODE DATE:  2022    Subjective:     Chief Complaint   Patient presents with    Wound Check     Initial Visit on Bilateral Shins; Right shin was hit and broke open in 2022; Left Ivy opened 3 days ago on 22; Patient's daughter has been using medihoney and/or Hydrofera Blue         HISTORY of PRESENT ILLNESS HPI     Sammie Lane is a 82 y.o. female who presents today for wound/ulcer evaluation. History of Wound Context: Patient presents as a new patient complaining of wounds on both legs of about one week duration. Patient's daughter was present to help provide the patient's HPI. Patient states the wounds started out as blood blisters. Patient states her daughter is helping her performing dressing changes with application of Hydrofera blue dressing to the wounds. Patient reports on and off drainage from the wounds. Patient states she has some aching pain with pressure to the wounds, but it is tolerable. Patient reports she has CHF and PVD managed by her cardiologist.     Patient also wanted to have her toenails trimmed. Patient states the nails are long and thick.     Wound/Ulcer Pain Timing/Severity: intermittent, mild  Quality of pain: aching  Severity:  2 / 10   Modifying Factors: Pain worsens with pressure to wounds  Associated Signs/Symptoms: edema, drainage and pain    Ulcer Identification:  Ulcer Type: venous    Contributing Factors: edema, lymphedema and decreased mobility    Acute Wound: Blisters    PAST MEDICAL HISTORY        Diagnosis Date    Atrial fibrillation (HCC)     CAD (coronary artery disease)     [facility] (congestive [facility] failure) ([facility]     Constipation 9/22/15    ED visit for constipation    COPD (chronic obstructive pulmonary disease) ([facility]     Falls 2021    Hyperlipidemia     Hypertension     Pain and swelling of left lower leg 4/7/2021    Peripheral vascular disease ([address] 4/7/2021    Thyroid disease     Traumatic open wound of lower leg with infection, left, subsequent encounter 4/20/2021    Traumatic open wound of lower leg, left, initial encounter 4/7/2021       PAST SURGICAL HISTORY    Past Surgical History:   Procedure Laterality Date    COLONOSCOPY      EYE SURGERY      bilateral cataract extraction    VASCULAR SURGERY      AAA repair  right carotid endarterectomy       FAMILY HISTORY    Family History   Problem Relation Age of Onset    Other Mother     Cancer Father     Heart Disease Father     Heart Disease Sister     Heart Disease Brother        SOCIAL HISTORY    Social History     Tobacco Use    Smoking status: Former Smoker    Smokeless tobacco: Never Used   Vaping Use    Vaping Use: Never used   Substance Use Topics    Alcohol use:  Yes     Alcohol/week: 7.0 standard drinks     Types: 7 Glasses of wine per week    Drug use: No       ALLERGIES    Allergies   Allergen Reactions    Seasonal        MEDICATIONS    Current Outpatient Medications on File Prior to Encounter   Medication Sig Dispense Refill    metoprolol (LOPRESSOR) 100 MG tablet Take 50 mg by mouth 2 times daily      atorvastatin (LIPITOR) 80 MG tablet Take 80 mg by mouth Daily with supper      apixaban (ELIQUIS) 2.5 MG TABS tablet Take 2.5 mg by mouth 2 times daily      furosemide (LASIX) 40 MG tablet Take 80 mg by mouth 2 times daily      Icosapent Ethyl (VASCEPA) 1 g CAPS capsule Take 2 capsules by mouth 2 times daily      Fe Bisgly-Succ-C-Thre-B12-FA (IRON-150 PO) Take 1 tablet by mouth daily      Wound Dressings ([facility] WOUND/BURN DRESSING) GEL gel Apply 1 each topically daily 1 Tube 0    isosorbide mononitrate (IMDUR) 60 MG extended release tablet Take 120 mg by mouth daily      pantoprazole sodium (PROTONIX) 40 MG PACK packet Take 40 mg by mouth every morning (before breakfast)  potassium chloride (KLOR-CON M) 10 MEQ extended release tablet Take 10 mEq by mouth 2 times daily      ranolazine (RANEXA) 500 MG extended release tablet Take 500 mg by mouth 2 times daily      spironolactone (ALDACTONE) 25 MG tablet Take 25 mg by mouth daily      fluticasone-salmeterol (WIXELA INHUB) 500-50 MCG/DOSE diskus inhaler Inhale 1 puff into the lungs 2 times daily      clopidogrel (PLAVIX) 75 MG tablet Take 75 mg by mouth daily      nitroGLYCERIN (NITROSTAT) 0.4 MG SL tablet Place 0.4 mg under the tongue every 5 minutes as needed for Chest pain Dissolve 1 tab under tongue at first sign of chest pain. May repeat every 5 minutes until relief is obtained. If pain persists after taking 3 tabs in a 15-minute period, or the pain is different than is typically experienced, call 9-1-1 immediately.  acetaminophen (TYLENOL) 325 MG tablet Take 650 mg by mouth every 4 hours as needed for Pain      albuterol (PROVENTIL HFA) 108 (90 BASE) MCG/ACT inhaler Inhale 2 puffs into the lungs every 6 hours as needed for Wheezing or Shortness of Breath. 1 Inhaler 2    tiotropium (SPIRIVA) 18 MCG inhalation capsule Inhale 18 mcg into the lungs daily.  methimazole (TAPAZOLE) 5 MG tablet Take 5 mg by mouth daily       Multiple Vitamins-Minerals (THERAPEUTIC MULTIVITAMIN-MINERALS) tablet Take 1 tablet by mouth daily.  CALCIUM CARB-CHOLECALCIFEROL PO Take by mouth        No current facility-administered medications on file prior to encounter. REVIEW OF SYSTEMS  Review of Systems    Pertinent items are noted in HPI. Objective:      /83   Pulse 95   Temp 97.4 °F (36.3 °C) (Temporal)   Resp 18     Wt Readings from Last 3 Encounters:   04/07/21 110 lb 7.2 oz (50.1 kg)   06/14/18 124 lb 12.5 oz (56.6 kg)   02/11/16 135 lb (61.2 kg)       PHYSICAL EXAM  Physical Exam    Patient is alert and oriented x 3, and is in no acute distress.     Vascular: DP and PT pulses not palpable bilateral, but audible and biphasic on Doppler exam. No Pedal hair noted bilateral. +1 pitting edema noted to LE bilateral.   Derm:  Skin is warm to cool from proximal leg to distal foot bilateral. Hyperpigmentation of skin to anterior leg bilateral. Toenails 1-5 bilateral are thickened, yellow and dystrophic. Neuro:  Protective sensation intact to 10/10 sites bilateral via a 5.07 [place] monofilament. Musculoskeletal: Muscle strength 5/5 with PF/DF/I and E of both feet. Decreased but stable ROM of 1st metatarsophalangeal joint bilateral without pain or crepitus. Assessment:        Problem List Items Addressed This Visit     Venous ulcer of left leg ([address]    Relevant Orders    Initiate Outpatient Wound Care Protocol    Venous ulcer of right leg ([facility] - Primary    Relevant Orders    Initiate Outpatient Wound Care Protocol           Procedure Note  Indications:  Based on my examination of this patient's wound(s)/ulcer(s) today, debridement is required to promote healing and evaluate the wound base. Performed by: Moises Morfin DPM    Consent obtained:  Yes    Time out taken:  Yes    Pain Control: Anesthetic  Anesthetic: 4% Lidocaine Liquid Topical       Debridement: Excisional Debridement    Using curette, #15 blade scalpel and forceps the wound(s)/ulcer(s) was/were debrided down through and including the removal of epidermis, dermis and subcutaneous tissue. Devitalized Tissue Debrided:  fibrin, biofilm and clots    Pre Debridement Measurements:  Are located in the Wound/Ulcer Documentation Flow Sheet    Diabetic/Pressure/Non Pressure Ulcers only:  Ulcer: Non-Pressure ulcer, fat layer exposed     Wound/Ulcer #: 1 and 2    Post Debridement Measurements:  Wound/Ulcer Descriptions are Pre Debridement except measurements:    Wound 02/16/22 Leg Right; Anterior; Lower #1 Noted 1/2022 (Active)   Wound Image   02/16/22 1524   Dressing Status Old drainage noted;New drainage noted 02/16/22 1524   Wound Cleansed Vashe 02/16/22 1601   Dressing/Treatment Hydrofera blue;Moisten with saline; Other (comment) 02/16/22 1601   Wound Length (cm) 3.2 cm 02/16/22 1524   Wound Width (cm) 2.4 cm 02/16/22 1524   Wound Depth (cm) 0.2 cm 02/16/22 1524   Wound Surface Area (cm^2) 7.68 cm^2 02/16/22 1524   Wound Volume (cm^3) 1.536 cm^3 02/16/22 1524   Post-Procedure Length (cm) 3.3 cm 02/16/22 1601   Post-Procedure Width (cm) 2.5 cm 02/16/22 1601   Post-Procedure Depth (cm) 0.3 cm 02/16/22 1601   Post-Procedure Surface Area (cm^2) 8.25 cm^2 02/16/22 1601   Post-Procedure Volume (cm^3) 2.475 cm^3 02/16/22 1601   Wound Assessment Granulation tissue;Slough 02/16/22 1524   Drainage Amount Moderate 02/16/22 1524   Drainage Description Serous; Yellow 02/16/22 1524   Odor None 02/16/22 1524   Nury-wound Assessment Maceration 02/16/22 1524   Margins Attached edges 02/16/22 1524   Wound Thickness Description not for Pressure Injury Full thickness 02/16/22 1524   Number of days: 0       Wound 02/16/22 Leg Left; Anterior; Lower #2 Noted 2/13/21 (Active)   Wound Image   02/16/22 1524   Dressing Status Old drainage noted 02/16/22 1524   Wound Cleansed Vashe 02/16/22 1601   Dressing/Treatment Hydrofera blue;Moisten with saline; Other (comment) 02/16/22 1601   Wound Length (cm) 2 cm 02/16/22 1524   Wound Width (cm) 2 cm 02/16/22 1524   Wound Depth (cm) 0.1 cm 02/16/22 1524   Wound Surface Area (cm^2) 4 cm^2 02/16/22 1524   Wound Volume (cm^3) 0.4 cm^3 02/16/22 1524   Post-Procedure Length (cm) 2.1 cm 02/16/22 1601   Post-Procedure Width (cm) 2.1 cm 02/16/22 1601   Post-Procedure Depth (cm) 0.2 cm 02/16/22 1601   Post-Procedure Surface Area (cm^2) 4.41 cm^2 02/16/22 1601   Post-Procedure Volume (cm^3) 0.882 cm^3 02/16/22 1601   Wound Assessment Granulation tissue;Slough 02/16/22 1524   Drainage Amount Moderate 02/16/22 1524   Drainage Description Serous; Yellow 02/16/22 1524   Odor None 02/16/22 1524   Nury-wound Assessment Maceration 02/16/22 1524   Margins Attached edges 02/16/22 1524   Wound Thickness Description not for Pressure Injury Full thickness 02/16/22 1524   Number of days: 0          Total Surface Area Debrided:  12 sq cm     Estimated Blood Loss:  Minimal    Hemostasis Achieved:  by pressure    Procedural Pain:  2  / 10     Post Procedural Pain:  0 / 10     Response to treatment:  Well tolerated by patient. Plan:   - Debrided toenails 1-5 bilateral in thickness and in length with nail nippers. Treatment Note please see attached Discharge Instructions    Written patient dismissal instructions given to patient and signed by patient or POA. Discharge [address] and Hyperbaric Oxygen Therapy   Physician Orders and Discharge Instructions  [address]   [address] [place] [address], [place] [address]  Telephone: [phone]    NAME:  Morgan Johnson OF BIRTH:  1935  MEDICAL RECORD NUMBER:  [id number]  DATE:  2/16/2022    Wound Cleansing:   Do not scrub or use excessive force. Cleanse wound prior to applying a clean dressing with:  [] Normal Saline  [x] Keep Wound Dry in Shower    [] Wound Cleanser   [] Cleanse wound with Mild Soap & Water  [] May Shower at Discharge   [] Other:       Topical Treatments:  Do not apply lotions, creams, or ointments to wound bed unless directed. [x] Apply moisturizing lotion to skin surrounding the wound prior to dressing change.  [] Apply antifungal ointment to skin surrounding the wound prior to dressing change.  [] Apply thin film of moisture barrier ointment to skin immediately around wound.   [x] Other: Apply Vashe for 5 minutes prior to dressing change      Dressings:           Wound Location Left and Right Lower Leg   [x] Apply Primary Dressing:                  [x] Hydrofera Blue        [x] Moisten with Saline        [x] Cover and Secure with:     [] Gauze [] Siddhartha [] Roll gauze   [] Ace Wrap [] Cover Roll Tape [] ABD     [x] Other: Optilock Avoid contact of tape with skin. [] Change dressing: [x] Do Not Change Dressing   [] Other:     Compression: Coban 2 Lite Left and Right Lower Legs  Apply: [x] Multilayer Compression Wrap Applied in Clinic [x]RightLeg [x]Left Leg   [x] Multi-layer compression. Do not get leg(s) with wrap wet. If wraps become too tight call the center or completely remove the wrap. [x] Elevate leg(s) above the level of the heart when sitting. [x] Avoid prolonged standing in one place. Dietary:  [] Diet as tolerated:   [] Calorie Diabetic Diet:   [x] No Added Salt:  [x] Increase Protein:   [] Other:     Activity:  [x] Activity as tolerated: If you are still having pain after you go home:  [x] Elevate the affected limb. [x] Use over-the-counter medications you would normally use for pain as permitted by your family doctor. [x] For persistent pain not relieved by the above interventions, please call your family doctor. Return Appointment:  [] Wound and dressing supply provider:   [] ECF or Home Healthcare:  [] Wound Assessment: [] Physician or NP scheduled for Wound Assessment:   [x] Return Appointment: With Dr. Darlyn Garcia DPM  in  [address] [place] [address])  [] Ordered tests:     Nurse Case Manger: Pearl Lopez   Electronically signed by Jarrett Cintron RN on 2/16/2022 at 4:01 PM     [address]: Should you experience any significant changes in your wound(s) or have questions about your wound care, please contact the [address] [place] [address] at [address] 8:00 am - 4:30 pm and Friday 8:00 am - 12:30 pm.  If you need help with your wound outside these hours and cannot wait until we are again available, contact your PCP or go to the hospital emergency room. PLEASE NOTE: IF YOU ARE UNABLE TO OBTAIN WOUND SUPPLIES, CONTINUE TO USE THE SUPPLIES YOU HAVE AVAILABLE UNTIL YOU ARE ABLE TO REACH US.  IT IS MOST IMPORTANT TO KEEP THE WOUND COVERED AT ALL TIMES.      Physician Signature:_______________________    Date: ___________ Time:  ____________        Dr Geremias Perkins                         Electronically signed by Franck Kerr DPM on 2/16/2022 at 5:06 PM

## 2022-02-16 NOTE — LETTER
[address]  [address] [facility]  [phone]  Dept: [phone]        Thank you Ms. Torre for choosing [facility] for your Wound Care needs. We hope you found your first visit both encouraging and educational.  We look forward to serving you throughout the healing process. Before your next visit please review the information you received in your [employer]. The first visit can be overwhelming and this is a useful tool to refresh what information you may have been given. If you find yourself with any questions prior to your upcoming appointment, please feel free to contact us. Often wounds can be challenging and it is our goal to see you through the healing process with as much support possible. Again, thank you for choosing [facility]!     Sincerely,      The Staff of [address] [place] [address] Pkwy and Hyperbaric Oxygen Therapy

## 2022-02-16 NOTE — LETTER
[address]  [address] OFFICE [place] 2 [address] 1212 [facility] [address]  [phone]  Dept: [phone]   TODAYS DATE: 2/10/2022    [address] [place] [address],4Th Floor Wound Care   Appointment Treatment Guidelines  Welcome Ms. Tyrese Winston to the [address] [place] [address] Pkwy. We appreciate the confidence you have shown in choosing us as your wound care provider. Our goal is to heal your wound(s) as quickly as possible. Please read the items below regarding the nature of your appointments. 1. We will make every effort to schedule appointments that are convenient for you. Certain days and times may not be available, depending on your providers office hours and details of your care. 2. Patients will not necessarily be brought to an exam room in the order in which they arrive. Many providers work out of this office and patients are here for different procedures. Our goal is to serve you as quickly as possible. 3. We acknowledge that your time is valuable. Please remember that wound healing takes time and we appreciate your understanding that the length of each patients appointment will vary depending upon their need. 4. It is for your protection that we ask for insurance cards, photo ID, and new consent forms on your first visit and periodically throughout your treatment at all our facilities. 5. Wound Care treatment is known to be most effective when provided on a regular basis. Missed appointments, and not following the recommended plan of care can result in ineffective treatment and a poor outcome. If you find it difficult to keep appointments or to follow the recommended plan of care, it is your responsibility to let the staff know, so that we can work with you toward a solution. 6. If you need to miss an appointment, please call to let us know. We expect 24 hours notice for all cancellations.  We also expect missed visits to be rescheduled as soon as possible, preferably within the same week to promote the most effective healing time for your wound(s). 7. If you will be late for an appointment, please call our center to be sure that the provider can still see you when you arrive. If you are more than 15 minutes late your appointment may need to be rescheduled. 8. If two (2) appointments are missed without notifying us, your care plan may be discontinued. The same may happen if multiple visits are cancelled or rescheduled, even with notice. A missed visit is time when another patient, who also needs care, could have been seen. Thank you for your understanding and consideration.

## 2022-02-16 NOTE — LETTER
[address]  [address] OFF[address] JES[address] 454 [facility]  [phone]  Dept: [phone]                 Dear Leanna Brown:     Maria Eugenia Fall Nicho   MEDICAL RECORD NUMBER: [id number]   AGE: 82 y.o. GENDER: female : 1935   TODAYS DATE: 2/10/2022        We would like to thank you for referring Ms. Torre to our Wound Care and [facility]. We appreciate your confidence in us and will strive to provide the best care possible for your patient. You can follow their plan of care in [facility] as needed. Please call us if you have any questions and we look forward to assisting your patient.     Sincerely,      [address] [place] [address] Pkwy and [facility]

## 2022-02-16 NOTE — PLAN OF CARE
Multilayer Compression Wrap   (Not Unna) Below the Knee    NAME:  Franco Perez OF BIRTH:  1935  MEDICAL RECORD NUMBER:  [id number]  DATE:  2/16/2022    Multilayer compression wrap: Removed old Multilayer wrap if indicated and wash leg with mild soap/water. Applied moisturizing agent to dry skin as needed. Applied primary and secondary dressing as ordered. Applied multilayered dressing below the knee to right lower leg. Applied multilayered dressing below the knee to left lower leg. Instructed patient/caregiver not to remove dressing and to keep it clean and dry. Instructed patient/caregiver on complications to report to provider, such as pain, numbness in toes, heavy drainage, and slippage of dressing. Instructed patient on purpose of compression dressing and on activity and exercise recommendations.       Electronically signed by Sheela Marroquin RN on 2/16/2022 at 4:54 PM

## 2022-02-16 NOTE — PLAN OF CARE
Patient Name:  Asif Adan  YOB: 1935  Today's Date:  February 16, 2022  Medical Record Number:  [id number]  Provider:    [address] [place] [address] Pkwy   Appointment Treatment Guidelines        The [address] [place] [address] Pkwy Appointment Treatment Guidelines were reviewed on February 16, 2022 with the patient. Ms. Hamlin Octavio understanding of the [address] [place] [address] Pkwy Appointment Treatment Guidelines.       Electronically signed by Radha Sanchez RN on 2/16/22 at 3:40 PM EST

## 2022-02-22 ENCOUNTER — TELEPHONE (OUTPATIENT)
Dept: WOUND CARE | Age: 87
End: 2022-02-22

## 2022-02-22 DIAGNOSIS — I83.029 VENOUS ULCER OF LEFT LEG (HCC): ICD-10-CM

## 2022-02-22 DIAGNOSIS — I83.019 VENOUS ULCER OF RIGHT LEG (HCC): ICD-10-CM

## 2022-02-22 DIAGNOSIS — L97.919 VENOUS ULCER OF RIGHT LEG (HCC): ICD-10-CM

## 2022-02-22 DIAGNOSIS — L97.929 VENOUS ULCER OF LEFT LEG (HCC): ICD-10-CM

## 2022-02-22 NOTE — TELEPHONE ENCOUNTER
Patient son Jolanta Palacios called this AM to notify staff his mother has enrolled in hospice an will not be returning to Wound Care. Per family member the patient's wraps were removed yesterday and a dry dressing with ace wraps was applied. The family member expressed an interest in continuing wound care at home with hospice and inquired as to what Wound Care was using to treat the wound and if it could be continued at home. Advised the son that since the patient is enrolled in Hospice all orders to continue her current dressing would need to come through them, the son was notified of the dressing that was being done here in clinic and all questions regarding this dressing were addressed. The son inquired if it would be permissible for Hospice to call the [address] [place] [address] Road to ask questions and the son was encouraged to do so. No other questions or concerns at this time.  Appointment cancelled for 2/23/22 at families request.  Electronically signed by Flavio Best RN on 2/22/2022 at 8:53 AM

## 2022-02-23 ENCOUNTER — HOSPITAL ENCOUNTER (OUTPATIENT)
Dept: WOUND CARE | Age: 87
Discharge: HOME | End: 2022-02-23